# Patient Record
Sex: MALE | Race: WHITE | Employment: FULL TIME | URBAN - METROPOLITAN AREA
[De-identification: names, ages, dates, MRNs, and addresses within clinical notes are randomized per-mention and may not be internally consistent; named-entity substitution may affect disease eponyms.]

---

## 2019-11-16 LAB
ANION GAP SERPL CALCULATED.3IONS-SCNC: 13 MEQ/L (ref 9–15)
BUN BLDV-MCNC: 11 MG/DL (ref 8–23)
CALCIUM SERPL-MCNC: 9.4 MG/DL (ref 8.5–9.9)
CHLORIDE BLD-SCNC: 101 MEQ/L (ref 95–107)
CO2: 26 MEQ/L (ref 20–31)
CREAT SERPL-MCNC: 0.77 MG/DL (ref 0.7–1.2)
GFR AFRICAN AMERICAN: >60
GFR NON-AFRICAN AMERICAN: >60
GLUCOSE FASTING: 107 MG/DL (ref 70–99)
HBA1C MFR BLD: 6 % (ref 4.8–5.9)
HCT VFR BLD CALC: 47.1 % (ref 42–52)
HEMOGLOBIN: 16 G/DL (ref 14–18)
MAGNESIUM: 2.2 MG/DL (ref 1.7–2.4)
MCH RBC QN AUTO: 30.7 PG (ref 27–31.3)
MCHC RBC AUTO-ENTMCNC: 34.1 % (ref 33–37)
MCV RBC AUTO: 90 FL (ref 80–100)
PDW BLD-RTO: 13.2 % (ref 11.5–14.5)
PLATELET # BLD: 208 K/UL (ref 130–400)
POTASSIUM SERPL-SCNC: 4.4 MEQ/L (ref 3.4–4.9)
RBC # BLD: 5.23 M/UL (ref 4.7–6.1)
SODIUM BLD-SCNC: 140 MEQ/L (ref 135–144)
TSH SERPL DL<=0.05 MIU/L-ACNC: 0.85 UIU/ML (ref 0.44–3.86)
VITAMIN D 25-HYDROXY: 55.6 NG/ML (ref 30–100)
WBC # BLD: 6.1 K/UL (ref 4.8–10.8)

## 2019-11-22 ENCOUNTER — HOSPITAL ENCOUNTER (OUTPATIENT)
Dept: CARDIAC CATH/INVASIVE PROCEDURES | Age: 64
Discharge: HOME | End: 2019-11-22
Attending: INTERNAL MEDICINE | Admitting: INTERNAL MEDICINE
Payer: COMMERCIAL

## 2019-11-22 VITALS
SYSTOLIC BLOOD PRESSURE: 140 MMHG | BODY MASS INDEX: 28.7 KG/M2 | WEIGHT: 205 LBS | RESPIRATION RATE: 13 BRPM | DIASTOLIC BLOOD PRESSURE: 86 MMHG | HEART RATE: 64 BPM | HEIGHT: 71 IN | OXYGEN SATURATION: 97 %

## 2019-11-22 LAB
ANION GAP SERPL CALCULATED.3IONS-SCNC: 10 MEQ/L (ref 9–15)
BUN BLDV-MCNC: 15 MG/DL (ref 8–23)
CALCIUM SERPL-MCNC: 9.6 MG/DL (ref 8.5–9.9)
CHLORIDE BLD-SCNC: 103 MEQ/L (ref 95–107)
CO2: 27 MEQ/L (ref 20–31)
CREAT SERPL-MCNC: 0.83 MG/DL (ref 0.7–1.2)
EKG ATRIAL RATE: 70 BPM
EKG P AXIS: -2 DEGREES
EKG P-R INTERVAL: 158 MS
EKG Q-T INTERVAL: 394 MS
EKG QRS DURATION: 86 MS
EKG QTC CALCULATION (BAZETT): 425 MS
EKG R AXIS: -20 DEGREES
EKG T AXIS: -5 DEGREES
EKG VENTRICULAR RATE: 70 BPM
GFR AFRICAN AMERICAN: >60
GFR NON-AFRICAN AMERICAN: >60
GLUCOSE BLD-MCNC: 95 MG/DL (ref 70–99)
HCT VFR BLD CALC: 44.7 % (ref 42–52)
HEMOGLOBIN: 15.2 G/DL (ref 14–18)
MCH RBC QN AUTO: 30.7 PG (ref 27–31.3)
MCHC RBC AUTO-ENTMCNC: 34.1 % (ref 33–37)
MCV RBC AUTO: 89.9 FL (ref 80–100)
PDW BLD-RTO: 13.1 % (ref 11.5–14.5)
PLATELET # BLD: 197 K/UL (ref 130–400)
POTASSIUM SERPL-SCNC: 4.4 MEQ/L (ref 3.4–4.9)
RBC # BLD: 4.97 M/UL (ref 4.7–6.1)
SODIUM BLD-SCNC: 140 MEQ/L (ref 135–144)
WBC # BLD: 7.6 K/UL (ref 4.8–10.8)

## 2019-11-22 PROCEDURE — 2580000003 HC RX 258: Performed by: INTERNAL MEDICINE

## 2019-11-22 PROCEDURE — 85027 COMPLETE CBC AUTOMATED: CPT

## 2019-11-22 PROCEDURE — 6360000002 HC RX W HCPCS

## 2019-11-22 PROCEDURE — 6370000000 HC RX 637 (ALT 250 FOR IP): Performed by: INTERNAL MEDICINE

## 2019-11-22 PROCEDURE — 80048 BASIC METABOLIC PNL TOTAL CA: CPT

## 2019-11-22 PROCEDURE — 2709999900 HC NON-CHARGEABLE SUPPLY

## 2019-11-22 PROCEDURE — 2580000003 HC RX 258

## 2019-11-22 PROCEDURE — C1894 INTRO/SHEATH, NON-LASER: HCPCS

## 2019-11-22 PROCEDURE — C1769 GUIDE WIRE: HCPCS

## 2019-11-22 PROCEDURE — 2500000003 HC RX 250 WO HCPCS

## 2019-11-22 PROCEDURE — 93458 L HRT ARTERY/VENTRICLE ANGIO: CPT | Performed by: INTERNAL MEDICINE

## 2019-11-22 PROCEDURE — 6370000000 HC RX 637 (ALT 250 FOR IP)

## 2019-11-22 RX ORDER — CLOPIDOGREL BISULFATE 75 MG/1
75 TABLET ORAL DAILY
Status: ON HOLD | COMMUNITY
End: 2019-11-22 | Stop reason: HOSPADM

## 2019-11-22 RX ORDER — NITROGLYCERIN 0.4 MG/1
0.4 TABLET SUBLINGUAL EVERY 5 MIN PRN
Status: ON HOLD | COMMUNITY
End: 2019-11-22 | Stop reason: HOSPADM

## 2019-11-22 RX ORDER — SODIUM CHLORIDE 9 MG/ML
INJECTION, SOLUTION INTRAVENOUS CONTINUOUS
Status: DISCONTINUED | OUTPATIENT
Start: 2019-11-22 | End: 2019-11-22 | Stop reason: HOSPADM

## 2019-11-22 RX ORDER — SODIUM CHLORIDE 0.9 % (FLUSH) 0.9 %
10 SYRINGE (ML) INJECTION PRN
Status: DISCONTINUED | OUTPATIENT
Start: 2019-11-22 | End: 2019-11-22 | Stop reason: HOSPADM

## 2019-11-22 RX ORDER — CLOPIDOGREL BISULFATE 75 MG/1
75 TABLET ORAL ONCE
Status: COMPLETED | OUTPATIENT
Start: 2019-11-22 | End: 2019-11-22

## 2019-11-22 RX ORDER — SODIUM CHLORIDE 0.9 % (FLUSH) 0.9 %
10 SYRINGE (ML) INJECTION EVERY 12 HOURS SCHEDULED
Status: DISCONTINUED | OUTPATIENT
Start: 2019-11-22 | End: 2019-11-22 | Stop reason: HOSPADM

## 2019-11-22 RX ORDER — ISOSORBIDE MONONITRATE 30 MG/1
30 TABLET, EXTENDED RELEASE ORAL DAILY
Status: ON HOLD | COMMUNITY
End: 2019-11-22 | Stop reason: HOSPADM

## 2019-11-22 RX ORDER — ALPRAZOLAM 0.5 MG/1
0.5 TABLET ORAL
Status: DISCONTINUED | OUTPATIENT
Start: 2019-11-22 | End: 2019-11-22 | Stop reason: HOSPADM

## 2019-11-22 RX ORDER — NITROGLYCERIN 0.4 MG/1
0.4 TABLET SUBLINGUAL EVERY 5 MIN PRN
Status: DISCONTINUED | OUTPATIENT
Start: 2019-11-22 | End: 2019-11-22 | Stop reason: HOSPADM

## 2019-11-22 RX ORDER — ACETAMINOPHEN 325 MG/1
650 TABLET ORAL EVERY 4 HOURS PRN
Status: DISCONTINUED | OUTPATIENT
Start: 2019-11-22 | End: 2019-11-22 | Stop reason: SDUPTHER

## 2019-11-22 RX ORDER — ACETAMINOPHEN 325 MG/1
650 TABLET ORAL EVERY 4 HOURS PRN
Status: DISCONTINUED | OUTPATIENT
Start: 2019-11-22 | End: 2019-11-22 | Stop reason: HOSPADM

## 2019-11-22 RX ORDER — MELOXICAM 15 MG/1
15 TABLET ORAL DAILY
COMMUNITY
End: 2021-12-11

## 2019-11-22 RX ORDER — ASPIRIN 81 MG/1
81 TABLET, CHEWABLE ORAL ONCE
Status: COMPLETED | OUTPATIENT
Start: 2019-11-22 | End: 2019-11-22

## 2019-11-22 RX ORDER — MAGNESIUM HYDROXIDE/ALUMINUM HYDROXICE/SIMETHICONE 120; 1200; 1200 MG/30ML; MG/30ML; MG/30ML
30 SUSPENSION ORAL EVERY 6 HOURS PRN
Status: DISCONTINUED | OUTPATIENT
Start: 2019-11-22 | End: 2019-11-22 | Stop reason: HOSPADM

## 2019-11-22 RX ADMIN — SODIUM CHLORIDE: 9 INJECTION, SOLUTION INTRAVENOUS at 14:36

## 2019-11-22 RX ADMIN — ASPIRIN 81 MG 81 MG: 81 TABLET ORAL at 15:06

## 2019-11-22 RX ADMIN — CLOPIDOGREL BISULFATE 75 MG: 75 TABLET ORAL at 15:03

## 2019-11-22 RX ADMIN — ACETAMINOPHEN 650 MG: 325 TABLET ORAL at 18:29

## 2019-11-22 RX ADMIN — ACETAMINOPHEN 650 MG: 325 TABLET ORAL at 14:55

## 2021-12-11 ENCOUNTER — APPOINTMENT (OUTPATIENT)
Dept: GENERAL RADIOLOGY | Age: 66
End: 2021-12-11
Payer: COMMERCIAL

## 2021-12-11 ENCOUNTER — HOSPITAL ENCOUNTER (EMERGENCY)
Age: 66
Discharge: HOME | End: 2021-12-11
Payer: COMMERCIAL

## 2021-12-11 VITALS
DIASTOLIC BLOOD PRESSURE: 88 MMHG | RESPIRATION RATE: 19 BRPM | TEMPERATURE: 98 F | HEART RATE: 65 BPM | HEIGHT: 71 IN | WEIGHT: 205 LBS | BODY MASS INDEX: 28.7 KG/M2 | OXYGEN SATURATION: 97 % | SYSTOLIC BLOOD PRESSURE: 170 MMHG

## 2021-12-11 DIAGNOSIS — M25.562 ACUTE PAIN OF LEFT KNEE: Primary | ICD-10-CM

## 2021-12-11 PROCEDURE — 99283 EMERGENCY DEPT VISIT LOW MDM: CPT

## 2021-12-11 PROCEDURE — 96372 THER/PROPH/DIAG INJ SC/IM: CPT

## 2021-12-11 PROCEDURE — 6360000002 HC RX W HCPCS: Performed by: PHYSICIAN ASSISTANT

## 2021-12-11 PROCEDURE — 73564 X-RAY EXAM KNEE 4 OR MORE: CPT

## 2021-12-11 RX ORDER — NAPROXEN 500 MG/1
500 TABLET ORAL 2 TIMES DAILY WITH MEALS
Qty: 60 TABLET | Refills: 0 | Status: SHIPPED | OUTPATIENT
Start: 2021-12-11

## 2021-12-11 RX ORDER — KETOROLAC TROMETHAMINE 30 MG/ML
60 INJECTION, SOLUTION INTRAMUSCULAR; INTRAVENOUS ONCE
Status: COMPLETED | OUTPATIENT
Start: 2021-12-11 | End: 2021-12-11

## 2021-12-11 RX ORDER — NAPROXEN 500 MG/1
500 TABLET ORAL 2 TIMES DAILY WITH MEALS
Qty: 60 TABLET | Refills: 0 | Status: SHIPPED | OUTPATIENT
Start: 2021-12-11 | End: 2021-12-11

## 2021-12-11 RX ADMIN — KETOROLAC TROMETHAMINE 60 MG: 30 INJECTION, SOLUTION INTRAMUSCULAR at 16:04

## 2021-12-11 ASSESSMENT — ENCOUNTER SYMPTOMS
APNEA: 0
SHORTNESS OF BREATH: 0
COLOR CHANGE: 0
ABDOMINAL PAIN: 0
TROUBLE SWALLOWING: 0
ALLERGIC/IMMUNOLOGIC NEGATIVE: 1
EYE PAIN: 0

## 2021-12-11 ASSESSMENT — PAIN DESCRIPTION - ORIENTATION: ORIENTATION: LEFT

## 2021-12-11 ASSESSMENT — PAIN SCALES - GENERAL
PAINLEVEL_OUTOF10: 1
PAINLEVEL_OUTOF10: 10

## 2021-12-11 ASSESSMENT — PAIN DESCRIPTION - LOCATION: LOCATION: KNEE

## 2021-12-11 ASSESSMENT — PAIN DESCRIPTION - PAIN TYPE: TYPE: ACUTE PAIN

## 2021-12-11 NOTE — ED PROVIDER NOTES
[address] [facility] ED  eMERGENCYdEPARTMENT eNCOUnter      Pt Name: Srinivas Arnett  MRN: [id number]  [place] 1955  Date of evaluation: 12/11/2021  Provider:All Elder PA-C    CHIEF COMPLAINT       Chief Complaint   Patient presents with    Knee Injury     PT WAS LIFTING AT WORK WHEN THE PAIN STARTED         HISTORY OF PRESENT ILLNESS  (Location/Symptom, Timing/Onset, Context/Setting, Quality, Duration, Modifying Factors, Severity.)   Srinivas Arnett is a 77 y.o. male who presents to the emergency department left knee pain primarily through the posterior cruciate ligament region and medial cruciate ligament. Patient states that he was at work on Wednesday and had bent over and was lifting parts when he felt a snap in the back of his knee and had difficulty standing. Patient does complain of some mild joint laxity since the injury occurred. Patient denies any numbness or tingling. HPI    Nursing Notes were reviewed and I agree. REVIEW OF SYSTEMS    (2-9 systems for level 4, 10 or more for level 5)     Review of Systems   Constitutional: Negative for diaphoresis and fever. HENT: Negative for hearing loss and trouble swallowing. Eyes: Negative for pain. Respiratory: Negative for apnea and shortness of breath. Cardiovascular: Negative for chest pain. Gastrointestinal: Negative for abdominal pain. Endocrine: Negative. Genitourinary: Negative for hematuria. Musculoskeletal: Positive for joint swelling. Negative for neck pain and neck stiffness. Skin: Negative for color change. Allergic/Immunologic: Negative. Neurological: Negative for dizziness and numbness. Hematological: Negative. Psychiatric/Behavioral: Negative. All other systems reviewed and are negative. Except as noted above the remainder of the review of systems was reviewed and negative. PAST MEDICAL HISTORY   History reviewed. No pertinent past medical history.       SURGICAL HISTORY     History reviewed. No pertinent surgical history. CURRENT MEDICATIONS       Discharge Medication List as of 12/11/2021  4:09 PM      CONTINUE these medications which have NOT CHANGED    Details   aspirin 81 MG tablet Take 81 mg by mouth dailyHistorical Med      metoprolol tartrate (LOPRESSOR) 25 MG tablet Take 25 mg by mouth 2 times dailyHistorical Med             ALLERGIES     Patient has no known allergies. FAMILY HISTORY     History reviewed. No pertinent family history. SOCIAL HISTORY       Social History     Socioeconomic History    Marital status: Single     Spouse name: None    Number of children: None    Years of education: None    Highest education level: None   Occupational History    None   Tobacco Use    Smoking status: Never Smoker    Smokeless tobacco: Never Used   Substance and Sexual Activity    Alcohol use: Not Currently     Alcohol/week: 2.0 standard drinks     Types: 2 Cans of beer per week    Drug use: Not Currently    Sexual activity: Not Currently   Other Topics Concern    None   Social History Narrative    None     Social Determinants of Health     Financial Resource Strain:     Difficulty of Paying Living Expenses: Not on file   Food Insecurity:     Worried About Running Out of Food in the Last Year: Not on file    [place] of Food in the Last Year: Not on file   Transportation Needs:     Lack of Transportation (Medical): Not on file    Lack of Transportation (Non-Medical):  Not on file   Physical Activity:     Days of Exercise per Week: Not on file    Minutes of Exercise per Session: Not on file   Stress:     Feeling of Stress : Not on file   Social Connections:     Frequency of Communication with Friends and Family: Not on file    Frequency of Social Gatherings with Friends and Family: Not on file    Attends Adventist Services: Not on file    Active Member of Clubs or Organizations: Not on file    Attends Club or Organization Meetings: Not on file    Marital Status: Not on file   Intimate Partner Violence:     Fear of Current or Ex-Partner: Not on file    Emotionally Abused: Not on file    Physically Abused: Not on file    Sexually Abused: Not on file   Housing Stability:     Unable to Pay for Housing in the Last Year: Not on file    Number of [place] in the Last Year: Not on file    Unstable Housing in the Last Year: Not on file       SCREENINGS           PHYSICAL EXAM    (up to 7 forlevel 4, 8 or more for level 5)     ED Triage Vitals   BP Temp Temp src Pulse Resp SpO2 Height Weight   12/11/21 1509 12/11/21 1503 -- 12/11/21 1503 12/11/21 1503 12/11/21 1503 12/11/21 1503 12/11/21 1503   (!) 170/88 98 °F (36.7 °C)  65 19 97 % 5' 11\" (1.803 m) 205 lb (93 kg)       Physical Exam  Vitals and nursing note reviewed. Constitutional:       General: He is not in acute distress. Appearance: He is well-developed. He is not diaphoretic. HENT:      Head: Normocephalic and atraumatic. Mouth/Throat:      Pharynx: No oropharyngeal exudate. Eyes:      General: No scleral icterus. Conjunctiva/sclera: Conjunctivae normal.      Pupils: Pupils are equal, round, and reactive to light. Neck:      Trachea: No tracheal deviation. Cardiovascular:      Rate and Rhythm: Normal rate. Heart sounds: Normal heart sounds. Pulmonary:      Effort: Pulmonary effort is normal. No respiratory distress. Breath sounds: Normal breath sounds. Abdominal:      General: Bowel sounds are normal. There is no distension. Palpations: Abdomen is soft. Musculoskeletal:         General: Normal range of motion. Cervical back: Normal range of motion and neck supple. Left knee: Tenderness present over the MCL and PCL. Skin:     General: Skin is warm and dry. Findings: No erythema or rash. Neurological:      Mental Status: He is alert and oriented to person, place, and time. Cranial Nerves: No cranial nerve deficit.       Motor: No abnormal muscle tone. Psychiatric:         Behavior: Behavior normal.         Thought Content: Thought content normal.         Judgment: Judgment normal.           DIAGNOSTIC RESULTS     RADIOLOGY:   Non-plain film images such as CT, Ultrasound and MRI are read by the radiologist. Plain radiographic images are visualized and preliminarilyinterpreted by Barry Power PA-C with the below findings:    No fx    Interpretation per the Radiologist below, if available at the time of this note:    XR KNEE LEFT (MIN 4 VIEWS)   Final Result   There are no acute osseous changes. There is a small joint effusion. LABS:  Labs Reviewed - No data to display    All other labs were within normal range or not returnedas of this dictation. EMERGENCYDEPARTMENT COURSE and DIFFERENTIAL DIAGNOSIS/MDM:   Vitals:    Vitals:    12/11/21 1503 12/11/21 1509   BP:  (!) 170/88   Pulse: 65    Resp: 19    Temp: 98 °F (36.7 °C)    SpO2: 97%    Weight: 205 lb (93 kg)    Height: 5' 11\" (1.803 m)        REASSESSMENT        Presented with left knee pain following an injury at work. Care was subsequently turned over to Aracelis Brand PA-C who discharged the patient. MDM    PROCEDURES:    Procedures      FINAL IMPRESSION      1.  Acute pain of left knee          DISPOSITION/PLAN   DISPOSITION Decision To Discharge 12/11/2021 04:05:33 PM      PATIENT REFERRED TO:  Juan Pablo Nunezbret Gilnakita [facility]  [address] [place] [phone]  Schedule an appointment as soon as possible for a visit       [address]  [facility] [address]  [address] [place] [address] Road:  Discharge Medication List as of 12/11/2021  4:09 PM      START taking these medications    Details   naproxen (NAPROSYN) 500 MG tablet Take 1 tablet by mouth 2 times daily (with meals), Disp-60 tablet, R-0Print             (Please note that portions of this note were completed with a voice recognition program.  Efforts were made to edit the dictations but occasionally words are mis-transcribed.)    TRAN Ramos PA-C  12/11/21 [id number]

## 2021-12-11 NOTE — Clinical Note
José Miguel Cannon was seen and treated in our emergency department on 12/11/2021. He may return to work on 12/14/2021. If you have any questions or concerns, please don't hesitate to call.       John Tomlinson PA-C

## 2022-01-14 ENCOUNTER — HOSPITAL ENCOUNTER (OUTPATIENT)
Dept: MRI IMAGING | Age: 67
Discharge: HOME | End: 2022-01-16
Payer: COMMERCIAL

## 2022-01-14 DIAGNOSIS — S83.92XD SPRAIN OF LEFT KNEE, UNSPECIFIED LIGAMENT, SUBSEQUENT ENCOUNTER: ICD-10-CM

## 2022-01-14 PROCEDURE — 73721 MRI JNT OF LWR EXTRE W/O DYE: CPT

## 2022-01-20 ENCOUNTER — OFFICE VISIT (OUTPATIENT)
Dept: ORTHOPEDIC SURGERY | Age: 67
End: 2022-01-20
Payer: COMMERCIAL

## 2022-01-20 VITALS
HEIGHT: 71 IN | BODY MASS INDEX: 28.73 KG/M2 | WEIGHT: 205.2 LBS | OXYGEN SATURATION: 98 % | TEMPERATURE: 97.4 F | HEART RATE: 78 BPM

## 2022-01-20 DIAGNOSIS — S86.912D STRAIN OF LEFT KNEE, SUBSEQUENT ENCOUNTER: Primary | ICD-10-CM

## 2022-01-20 DIAGNOSIS — S80.02XD CONTUSION OF LEFT KNEE, SUBSEQUENT ENCOUNTER: ICD-10-CM

## 2022-01-20 PROCEDURE — 20610 DRAIN/INJ JOINT/BURSA W/O US: CPT | Performed by: ORTHOPAEDIC SURGERY

## 2022-01-20 PROCEDURE — 99204 OFFICE O/P NEW MOD 45 MIN: CPT | Performed by: ORTHOPAEDIC SURGERY

## 2022-01-20 RX ORDER — CARVEDILOL 12.5 MG/1
TABLET ORAL
COMMUNITY
Start: 2021-10-21

## 2022-01-20 RX ORDER — LIDOCAINE HYDROCHLORIDE 10 MG/ML
5 INJECTION, SOLUTION INFILTRATION; PERINEURAL ONCE
Status: COMPLETED | OUTPATIENT
Start: 2022-01-20 | End: 2022-01-20

## 2022-01-20 RX ORDER — METHYLPREDNISOLONE ACETATE 80 MG/ML
80 INJECTION, SUSPENSION INTRA-ARTICULAR; INTRALESIONAL; INTRAMUSCULAR; SOFT TISSUE ONCE
Status: COMPLETED | OUTPATIENT
Start: 2022-01-20 | End: 2022-01-20

## 2022-01-20 RX ADMIN — LIDOCAINE HYDROCHLORIDE 5 ML: 10 INJECTION, SOLUTION INFILTRATION; PERINEURAL at 16:35

## 2022-01-20 RX ADMIN — METHYLPREDNISOLONE ACETATE 80 MG: 80 INJECTION, SUSPENSION INTRA-ARTICULAR; INTRALESIONAL; INTRAMUSCULAR; SOFT TISSUE at 16:38

## 2022-01-20 NOTE — PROGRESS NOTES
Subjective:      Patient ID: Severiano Starling is a 77 y.o. male who presents today for:  Chief Complaint   Patient presents with    Knee Injury     left knee injury. MRI done 01/14/2022       HPI  Seeing patient for a injury left knee according to patient he was lifting a large door and some hinges and felt his left knee give way and felt significant amounts of pain. This occurred over 1 month ago and was seen as a Worker's Compensation injury by occupational health. He did some exercises pain is got no better MRI finally ordered, patient presents today for further evaluation. No past medical history on file. No past surgical history on file. Social History     Socioeconomic History    Marital status: Single     Spouse name: Not on file    Number of children: Not on file    Years of education: Not on file    Highest education level: Not on file   Occupational History    Not on file   Tobacco Use    Smoking status: Never Smoker    Smokeless tobacco: Never Used   Substance and Sexual Activity    Alcohol use: Not Currently     Alcohol/week: 2.0 standard drinks     Types: 2 Cans of beer per week    Drug use: Not Currently    Sexual activity: Not Currently   Other Topics Concern    Not on file   Social History Narrative    Not on file     Social Determinants of Health     Financial Resource Strain:     Difficulty of Paying Living Expenses: Not on file   Food Insecurity:     Worried About Running Out of Food in the Last Year: Not on file    [place]ho[place] of Food in the Last Year: Not on file   Transportation Needs:     Lack of Transportation (Medical): Not on file    Lack of Transportation (Non-Medical):  Not on file   Physical Activity:     Days of Exercise per Week: Not on file    Minutes of Exercise per Session: Not on file   Stress:     Feeling of Stress : Not on file   Social Connections:     Frequency of Communication with Friends and Family: Not on file    Frequency of Social Gatherings with Friends and Family: Not on file    Attends Buddhist Services: Not on file    Active Member of Clubs or Organizations: Not on file    Attends Club or Organization Meetings: Not on file    Marital Status: Not on file   Intimate Partner Violence:     Fear of Current or Ex-Partner: Not on file    Emotionally Abused: Not on file    Physically Abused: Not on file    Sexually Abused: Not on file   Housing Stability:     Unable to Pay for Housing in the Last Year: Not on file    Number of [place] in the Last Year: Not on file    Unstable Housing in the Last Year: Not on file     No Known Allergies  Current Outpatient Medications on File Prior to Visit   Medication Sig Dispense Refill    carvedilol (COREG) 12.5 MG tablet TAKE 1 TABLET TWICE DAILY.  naproxen (NAPROSYN) 500 MG tablet Take 1 tablet by mouth 2 times daily (with meals) (Patient not taking: Reported on 1/20/2022) 60 tablet 0    aspirin 81 MG tablet Take 81 mg by mouth daily (Patient not taking: Reported on 1/20/2022)      metoprolol tartrate (LOPRESSOR) 25 MG tablet Take 25 mg by mouth 2 times daily (Patient not taking: Reported on 1/20/2022)       No current facility-administered medications on file prior to visit. Review of Systems    Objective:   Pulse 78   Temp 97.4 °F (36.3 °C) (Temporal)   Ht 5' 11\" (1.803 m)   Wt 205 lb 3.2 oz (93.1 kg)   SpO2 98%   BMI 28.62 kg/m²   Ortho Exam   Primary months of pain is over the anteromedial aspect of his left knee. His range of motion is very rigid from 10 degrees short of full extension only to about 90 degrees of flexion at which time his pain overcomes him and he is not able to do any more movement. His collateral ligaments are intact and I am not able to detect a positive Lachman at this time.   His extensor mechanism is intact  Radiographs and Laboratory Studies:     Diagnostic Imaging Studies:    X-rays that were previously taken of his left knee failed to show any sign of joint space narrowing tricompartmentally or any sign of dislocation or fracture. MRI reading is quite extensive however does not reveal any ligamentous or meniscal pathology that would suggest immediate surgical intervention. This demonstrates primarily bone bruising involving the medial compartment    Assessment:       Diagnosis Orders   1. Strain of left knee, subsequent encounter     2. Contusion of left knee, subsequent encounter           Plan:     Feel cortisone shot is indicated being that he has not been injected at all, this was done using a sterile prep injecting 5 cc 1% lidocaine 1 cc of 80 mg Depo-Medrol into the knee without any resistance  Obviously he is not able to return to work and can follow-up with me in about a month     No orders of the defined types were placed in this encounter. No orders of the defined types were placed in this encounter. No follow-ups on file.       Emy Espinoza MD

## 2022-01-20 NOTE — PROGRESS NOTES
Patient's name, date of birth, and allergies have been confirmed. Patient is aware that injection is to be given in Left knee. He/she is aware that they will be seeing Dr. Orlando Vital and the injection will be given by him. A timeout was performed immediately prior to the start of the cortisone injection procedure and included the correct patient (two identifiers), correct procedure and correct site(s). Procedure consent and allergies were also verified.

## 2022-02-17 ENCOUNTER — OFFICE VISIT (OUTPATIENT)
Dept: ORTHOPEDIC SURGERY | Age: 67
End: 2022-02-17
Payer: COMMERCIAL

## 2022-02-17 ENCOUNTER — HOSPITAL ENCOUNTER (OUTPATIENT)
Dept: ORTHOPEDIC SURGERY | Age: 67
Discharge: HOME | End: 2022-02-19
Payer: COMMERCIAL

## 2022-02-17 VITALS
TEMPERATURE: 97.1 F | RESPIRATION RATE: 16 BRPM | WEIGHT: 205 LBS | HEART RATE: 69 BPM | OXYGEN SATURATION: 95 % | BODY MASS INDEX: 28.7 KG/M2 | HEIGHT: 71 IN

## 2022-02-17 DIAGNOSIS — S86.912D STRAIN OF LEFT KNEE, SUBSEQUENT ENCOUNTER: Primary | ICD-10-CM

## 2022-02-17 DIAGNOSIS — S86.912D STRAIN OF LEFT KNEE, SUBSEQUENT ENCOUNTER: ICD-10-CM

## 2022-02-17 DIAGNOSIS — M17.0 PRIMARY OSTEOARTHRITIS OF BOTH KNEES: ICD-10-CM

## 2022-02-17 PROCEDURE — 99214 OFFICE O/P EST MOD 30 MIN: CPT | Performed by: PHYSICIAN ASSISTANT

## 2022-02-17 PROCEDURE — 73562 X-RAY EXAM OF KNEE 3: CPT | Performed by: ORTHOPAEDIC SURGERY

## 2022-02-17 PROCEDURE — 73562 X-RAY EXAM OF KNEE 3: CPT

## 2022-02-17 RX ORDER — MELOXICAM 15 MG/1
15 TABLET ORAL DAILY PRN
Qty: 30 TABLET | Refills: 0 | Status: SHIPPED | OUTPATIENT
Start: 2022-02-17

## 2022-02-17 NOTE — PROGRESS NOTES
[facility]      Subjective:      Chief Complaint   Patient presents with    Follow-up     1 mo from 1/20/2022, due to Strain of left knee. Pt states it's getting better pain isn't as bad as before. Has been able to walk on it. HPI: Bert Leal is a 79 y.o. male who is here for left knee pain. Initially seen by Dr. Isa Acevedo about a month ago where given injection. MRI showed degenerative meniscus, bone bruising and arthritic changes. States that the injection helped for a few weeks but his pain is since returned. No locking or instability. He has a hinged knee brace but it is not been wearing it. Taking Aleve as needed    No past medical history on file. No past surgical history on file. Social History     Socioeconomic History    Marital status: Single     Spouse name: Not on file    Number of children: Not on file    Years of education: Not on file    Highest education level: Not on file   Occupational History    Not on file   Tobacco Use    Smoking status: Never Smoker    Smokeless tobacco: Never Used   Substance and Sexual Activity    Alcohol use: Not Currently     Alcohol/week: 2.0 standard drinks     Types: 2 Cans of beer per week    Drug use: Not Currently    Sexual activity: Not Currently   Other Topics Concern    Not on file   Social History Narrative    Not on file     Social Determinants of Health     Financial Resource Strain:     Difficulty of Paying Living Expenses: Not on file   Food Insecurity:     Worried About Running Out of Food in the Last Year: Not on file    Jhoana of Food in the Last Year: Not on file   Transportation Needs:     Lack of Transportation (Medical): Not on file    Lack of Transportation (Non-Medical):  Not on file   Physical Activity:     Days of Exercise per Week: Not on file    Minutes of Exercise per Session: Not on file   Stress:     Feeling of Stress : Not on file   Social Connections:     Frequency of Communication with Friends and Family: Not on file    Frequency of Social Gatherings with Friends and Family: Not on file    Attends Judaism Services: Not on file    Active Member of Clubs or Organizations: Not on file    Attends Club or Organization Meetings: Not on file    Marital Status: Not on file   Intimate Partner Violence:     Fear of Current or Ex-Partner: Not on file    Emotionally Abused: Not on file    Physically Abused: Not on file    Sexually Abused: Not on file   Housing Stability:     Unable to Pay for Housing in the Last Year: Not on file    Number of [place] in the Last Year: Not on file    Unstable Housing in the Last Year: Not on file     No family history on file. No Known Allergies  Current Outpatient Medications on File Prior to Visit   Medication Sig Dispense Refill    carvedilol (COREG) 12.5 MG tablet TAKE 1 TABLET TWICE DAILY. (Patient not taking: Reported on 2/17/2022)      naproxen (NAPROSYN) 500 MG tablet Take 1 tablet by mouth 2 times daily (with meals) (Patient not taking: Reported on 1/20/2022) 60 tablet 0    aspirin 81 MG tablet Take 81 mg by mouth daily (Patient not taking: Reported on 1/20/2022)      metoprolol tartrate (LOPRESSOR) 25 MG tablet Take 25 mg by mouth 2 times daily (Patient not taking: Reported on 1/20/2022)       No current facility-administered medications on file prior to visit. Objective:   Pulse 69   Temp 97.1 °F (36.2 °C) (Temporal)   Resp 16   Ht 5' 11\" (1.803 m)   Wt 205 lb (93 kg)   SpO2 95%   BMI 28.59 kg/m²       Radiographs and Laboratory Studies:   Previous diagnostic imaging studies were reviewed. Narrative   MRI of the left knee.       HISTORY: Pain and limited range of motion.  Per 12/11/2021 electronic medical records \"Patient states that he was at work on Wednesday and had bent over and was lifting parts when he felt a snap in the back of his knee and had difficulty standing\".       COMPARISON: 12/11/2021 left knee x-ray..       TECHNIQUE: Coronal and sagittal proton-density with fat suppression. Sagittal T2 with fat suppression. Axial T2 with fat suppression. Coronal T1. 3 plane gradient echo localizer. .           FINDINGS:       Moderate to marked anterior diffuse periarticular subcutaneous fat edema.        Vastus medialis and lateralis posterior muscle margin mild edema signal just above the patellar level likely representing mild strain.       Medial femoral condyle and proximal medial tibial diffuse moderate marrow edema-like signal extending to the midline compatible with bone bruising, given the history of recent injury, covering a nearly 5 cm in maximum diameter portion of the femur and 4    cm of the tibia. Tibial linear subchondral decreased signal line paralleling the weightbearing surface may represent trabecular crowding associated with subchondral trabecular impaction or is degenerative sclerosis signal. No significant tibial plateau    depression. Question subtle low signal trabecular crowding or localized bone bruising sparing at the anterior medial femoral condyle. No osteochondral defects.       Medial joint compartment articular cartilage surfaces are relatively well-maintained. There may be very mild tibial cartilage thinning.       Medial meniscus mid body inner third tip truncation either degenerative or associated with recent injury. Posterior horn medial meniscus and adjacent posterior meniscal body faint poorly defined linear intrasubstance signal extending from the outer third    towards the inferior articulating margin of the middle third representing degeneration and/or degenerative tearing. Alternatively meniscal contusion given the medial joint compartment bone bruising. No fluid bright signal within this area to    unequivocally confirm tear.  Posterior medial meniscal capsular structures and semimembranosus tendon tibial expansions are intact.       Medial collateral ligament is intact without discontinuity. Mild reactive edema deep to the ligament likely associated with medial bone bruising described above.       Lateral meniscus is intact. Nonspecific anterior horn lateral meniscal root signal can be accounted for by decussation of the adjacent anterior cruciate ligament fibers. Alternatively degeneration.       Lateral collateral ligament, biceps femoris distal tendon, and iliotibial band are intact.       Lateral joint compartment articular cartilage surfaces relatively well-maintained.       Popliteus musculotendinous junction approximate 6 x 14 mm in diameter area of fluid bright signal compatible with small muscle strain/partial tear. Small amount of fluid extending more superiorly along the tendon sheath. Popliteus tendon near the femoral    attachment has mild-moderate intrasubstance signal but no discontinuity either representing degeneration or tendon strain.       Anterior cruciate ligament mild-moderate intrasubstance signal but a taut morphology without significant fiber discontinuity. Findings may represent mild sprain and/or degenerative signal. Some of the increased signal can relate to adjacent synovium.       Posterior cruciate ligament is intact.       Patellar tendon is intact. Mild intrasubstance signal focally at the patellar attachment is typically degenerative. Mild sprain or tendinosis not excluded. Quadriceps tendon and patellar retinacula intact. No patella patience or [place]. Hoffa's fat pad edema    likely reactive associated with femoral and tibial bone bruising.       Patellofemoral articular cartilage surfaces relatively well-maintained. Subcentimeter subchondral bone bruising or vessel at the femoral trochlea.       Small knee joint effusion. Mild synovial hypertrophy in the suprapatellar recess. No fluid differential signal level to indicate intra-articular hemorrhage.               Impression       Medial femoral and tibial bone bruising.  Subtle medial tibial subchondral effects of this. We also discussed an off  brace. This could very much help with his symptoms. Is instructed to work on therapy exercises. We will see her back in 4 weeks and if is not improving can consider an injection in a custom brace. If he is to be seen sooner he will call our office to let us know. The above plan was discussed in thorough detail with Dr. Tien Linares and the patient. No orders of the defined types were placed in this encounter. No orders of the defined types were placed in this encounter. No follow-ups on file.     Efe Da Silva PA-C  [address] [facility]  [phone]

## 2022-02-22 ENCOUNTER — HOSPITAL ENCOUNTER (OUTPATIENT)
Dept: PHYSICAL THERAPY | Age: 67
Setting detail: THERAPIES SERIES
Discharge: HOME | End: 2022-02-22
Payer: COMMERCIAL

## 2022-02-22 PROCEDURE — 97110 THERAPEUTIC EXERCISES: CPT | Performed by: PHYSICAL THERAPIST

## 2022-02-22 PROCEDURE — 97162 PT EVAL MOD COMPLEX 30 MIN: CPT | Performed by: PHYSICAL THERAPIST

## 2022-02-22 PROCEDURE — G0283 ELEC STIM OTHER THAN WOUND: HCPCS | Performed by: PHYSICAL THERAPIST

## 2022-02-22 ASSESSMENT — PAIN DESCRIPTION - ONSET: ONSET: SUDDEN

## 2022-02-22 ASSESSMENT — PAIN DESCRIPTION - FREQUENCY: FREQUENCY: CONTINUOUS

## 2022-02-22 ASSESSMENT — PAIN DESCRIPTION - PROGRESSION: CLINICAL_PROGRESSION: NOT CHANGED

## 2022-02-22 ASSESSMENT — PAIN SCALES - GENERAL: PAINLEVEL_OUTOF10: 4

## 2022-02-22 ASSESSMENT — PAIN DESCRIPTION - DESCRIPTORS: DESCRIPTORS: SHARP

## 2022-02-22 ASSESSMENT — PAIN DESCRIPTION - LOCATION: LOCATION: KNEE

## 2022-02-22 ASSESSMENT — PAIN DESCRIPTION - ORIENTATION: ORIENTATION: LEFT;MID

## 2022-02-22 ASSESSMENT — PAIN DESCRIPTION - PAIN TYPE: TYPE: ACUTE PAIN

## 2022-02-22 ASSESSMENT — PAIN - FUNCTIONAL ASSESSMENT: PAIN_FUNCTIONAL_ASSESSMENT: PREVENTS OR INTERFERES SOME ACTIVE ACTIVITIES AND ADLS

## 2022-02-22 NOTE — PROGRESS NOTES
[facility]  PHYSICAL THERAPY EVALUATION    Date: 2022  Patient Name: Celia Ruiz       MRN: [id number]   Account: [de-identified]   : 1955  (79 y.o.)   Gender: male   Referring Practitioner: Gloria Barrios M.D. Diagnosis: Sprain of the left knee  Treatment Diagnosis: Painful and weak left knee with antalgic gait pattern  Additional Pertinent Hx: High blood pressure        Other position/activity restrictions: Currently off work, no light duty    Past Medical History:  has no past medical history on file. Past Surgical History:   has no past surgical history on file. Vital Signs  Patient Currently in Pain: Yes   Pain Screening  Patient Currently in Pain: Yes  Pain Assessment  Pain Assessment: 0-10  Pain Level: 4  Patient's Stated Pain Goal: No pain  Pain Type: Acute pain  Pain Location: Knee  Pain Orientation: Left;Mid  Pain Descriptors: Sharp  Pain Frequency: Continuous  Pain Onset: Sudden  Clinical Progression: Not changed  Functional Pain Assessment: Prevents or interferes some active activities and ADLs                Lives With: Alone  Type of Home: House  Home Layout: Two level  Home Access: Stairs to enter with rails  Entrance Stairs - Number of Steps: 2  Bathroom Shower/Tub: Walk-in shower  ADL Assistance: Independent  Homemaking Assistance: Independent  Ambulation Assistance: Independent  Active : Yes  Mode of Transportation: Truck  Occupation: Full time employment        Subjective:  Subjective: Patient reports that he was lifting a panel and bend down and his left knee \"let go\"  He had immediate severe pain, and could not walk on his left. It also immediately swelled. He went to ER and had an X-ray which was neg. He was seen in Occupational health in [place]  He had an MRI which revealed possible medial meniscus tearing and bone bruising. He was seen by ortho injected his knee, but the patient stated that it made it worse.   He was then ordered physical therapy. Objective:        Strength RLE  Strength RLE: WNL  Strength LLE  Strength LLE: Exception  L Hip Flexion: 4-/5  L Hip Extension: 4-/5  L Hip ABduction: 4-/5  L Knee Flexion: 4-/5  L Knee Extension: 4-/5  L Ankle Dorsiflexion: 4-/5  L Ankle Plantar Flexion: 4-/5                AROM RLE (degrees)  RLE AROM: WNL     AROM LLE (degrees)  LLE AROM : Exceptions  L Hip Flexion 0-125: 0-90  L Hip Extension 0-10: 0  L Hip ABduction 0-45: 0-30  L Hip ADduction 0-10: 0-10  L Knee Flexion 0-145: 0-90  L Knee Extension 0: -15           Additional Measures  Girth: Left knee:  mid patella 40.5 cm, superior patella 41.5 cm, infrapatella 36 cm  Other: Atrophy of the left thigh         Exercises:   Exercises  Exercise 1: Quad sets  Exercise 2: SLR  Exercise 3: heel slides  Exercise 4: Sitting full arcs  Modalities: IES and moist heat        *Indicates exercise,modality, or manual techniques to be initiated when appropriate  Assessment: Body structures, Functions, Activity limitations: Decreased functional mobility ,Decreased ROM,Decreased strength,Increased pain  Assessment: Problem List:  1. Decrease active range of motion left knee  2. Decreased strength of the left knee 3.  mild swelling left knee  4. Antalgic gait pattern  5. Pain left knee 6. LEFI score   7.   Unable to tolerate regular work duty  Prognosis: Good  Discharge Recommendations: Continue to assess pending progress  Activity Tolerance: Patient limited by pain     Decision Making: Low Complexity  History: low  Exam: Medium  Clinical Presentation: Medium        Outcomes Score:     Plan  Frequency/Duration:  Plan  Times per week: 2  Plan weeks: 6  Current Treatment Recommendations: [id number] Exercise Program         Patient Education  New Education Provided: PT Education: Goals;PT Role;Plan of Care;Home Exercise Program    POST-PAIN     Pain Rating (0-10 pain scale): 4  /10  Location and pain description same as pre-treatment unless indicated. Action: [] NA  [] Call Physician  [x] Perform HEP  [] Meds as prescribed    Evaluation and patient rights have been reviewed and patient agrees with plan of care. Yes  [x]  No  []   Explain:       Teresa Fall Risk Assessment  Risk Factor Scale  Score   History of Falls [] Yes  [x] No 25  0 0   Secondary Diagnosis [] Yes  [x] No 15  0 0   Ambulatory Aid [] Furniture  [] Crutches/cane/walker  [x] None/bedrest/wheelchair/nurse 30  15  0 0   IV/Heparin Lock [] Yes  [x] No 20  0 0   Gait/Transferring [] Impaired  [] Weak  [x] Normal/bedrest/immobile 20  10  0 0   Mental Status [] Forgets limitations  [x] Oriented to own ability 15  0 0      Total:       0     Based on the Assessment score: check the appropriate box. [x]  No intervention needed   Low =   Score of 0-24  []  Use standard prevention interventions Moderate =  Score of 24-44   [] Discuss fall prevention strategies   [] Indicate moderate falls risk on eval  []  Use high risk prevention interventions High = Score of 45 and higher   [] Discuss fall prevention strategies   [] Provide supervision during treatment time    Goals  Short term goals  Time Frame for Short term goals: 3-5 treatments  Short term goal 1:  Increase flexion to 100 degrees, extension to 0  Short term goal 2: No further soft tissue swelling of the left knee  Short term goal 3: Pain decreased to 2/10  Long term goals  Time Frame for Long term goals : 6-12 treatment  Long term goal 1: Active range of motion of the left knee within normal limits  Long term goal 2: Strength of the left knee 5/5  Long term goal 3: Pain 0/10 to 1/10 left knee  Long term goal 4: Ambulate with reciprical gait with no antalgia  Long term goal 5: LEFI score will indicate no to minimal disabililty  Long term goal 6: Patient will be able to return to full duty    Treatment Initiated : RENU and moist heat to the left knee and range of motion ex    PT Individual Minutes  Time In: 1560  Time Out: 1110  Minutes: 55  Timed Code Treatment Minutes: 20 Minutes  Procedure Minutes: 20 minutes eval, IES 15 minutes     Modality Time In Time Out Total Time Units    PT Evaluation: Low Complexity (39024)       PT Evaluation: Moderate Complexity (32883) 1015 1035 20 1   PT Evaluation:High Complexity (42256)       Ther ex (99543) 1050 1110 20 1   Manual Therapy (86731)       Neuro re-ed (50175)       Massage (22461)       Estim unattended   (39593) 1035 1050 15 1       Electronically signed by Bree Auguste, PT on 2/22/22 at 5:44 PM EST

## 2022-02-23 NOTE — PROGRESS NOTES
[address]  PHYSICAL THERAPY PLAN OF CARE   [address], [address] [facility]         Ph: [phone]  Fax: [phone]    [] Certification  [] Recertification [x]  Plan of Care  [] Progress Note [] Discharge      To:  Abelardo Sanches M.D. From:  Khloe Venegas PT  Patient: Lieutenant Null     : 1955  Diagnosis: Sprain of the left knee     Date: 2022  Treatment Diagnosis: Painful and weak left knee with antalgic gait pattern    Plan of Care/Certification Expiration Date: 03/15/22  Progress Report Period from:  2022  to 2022    Total # of Visits to Date: 1              OBJECTIVE:   Short Term Goals - Time Frame for Short term goals: 3-5 treatments    Goals Current/Discharge status  Met   Short term goal 1:  Increase flexion to 100 degrees, extension to 0  Knee flexion 0-90, extension -15 [] yes  [] no   Short term goal 2: No further soft tissue swelling of the left knee  Mild swelling of the left knee [] yes  [] no   Short term goal 3: Pain decreased to 2/10  Pain 4/10 [] yes  [] no       [] yes  [] no      []  yes  []  no     Long Term Goals - Time Frame for Long term goals : 6-12 treatment  Goals Current/ Discharge status Met   Long term goal 1: Active range of motion of the left knee within normal limits See above [] yes  [] no   Long term goal 2: Strength of the left knee 5/5 Strength of the left knee grossly 4-/5 [] yes  [] no   Long term goal 3: Pain 0/10 to 1/10 left knee Pain 4/10 [] yes  [] no   Long term goal 4: Ambulate with reciprical gait with no antalgia Antalgic gait pattern noted [] yes  [] no   Long term goal 5: LEFI score will indicate no to minimal disabililty Moderate disability  [] yes  [] no    Long term goal 6: Patient will be able to return to full duty Currently off work [] yes  [] no       [] yes  [] no        Body structures, Functions, Activity limitations: Decreased functional mobility ,Decreased ROM,Decreased strength,Increased pain  Assessment: Problem List:  1. Decrease active range of motion left knee  2. Decreased strength of the left knee 3.  mild swelling left knee  4. Antalgic gait pattern  5. Pain left knee 6. LEFI score   7. Unable to tolerate regular work duty  Prognosis: Good  Discharge Recommendations: Continue to assess pending progress           PLAN: [] Evaluate and Treat  Frequency/Duration:  Plan  Times per week: 2  Plan weeks: 6  Current Treatment Recommendations: Strengthening,ROM,Pain Management,Modalities,Manual Therapy - Joint Manipulation,Manual Therapy - Soft Tissue Mobilization,Home Exercise Program     Precautions: Other position/activity restrictions: Currently off work, no light duty                  Patient Status:[x] Continue/ Initiate plan of Care    [] Discharge PT. Recommend pt continue with HEP. [] Additional visits requested, Please re-certify for additional visits:          Signature: Electronically signed by Isaak Major PT on 2/23/22 at 8:28 AM EST      If you have any questions or concerns, please don't hesitate to call. Thank you for your referral.    I have reviewed this plan of care and certify a need for medically necessary rehabilitation services.     Physician Signature:__________________________________________________________  Date:  Please sign and return

## 2022-02-25 ENCOUNTER — HOSPITAL ENCOUNTER (OUTPATIENT)
Dept: PHYSICAL THERAPY | Age: 67
Setting detail: THERAPIES SERIES
Discharge: HOME | End: 2022-02-25
Payer: COMMERCIAL

## 2022-02-25 PROCEDURE — 97110 THERAPEUTIC EXERCISES: CPT

## 2022-02-25 PROCEDURE — G0283 ELEC STIM OTHER THAN WOUND: HCPCS

## 2022-02-25 PROCEDURE — 97116 GAIT TRAINING THERAPY: CPT

## 2022-02-25 ASSESSMENT — PAIN DESCRIPTION - PAIN TYPE: TYPE: ACUTE PAIN

## 2022-02-25 ASSESSMENT — PAIN DESCRIPTION - LOCATION: LOCATION: KNEE

## 2022-02-25 ASSESSMENT — PAIN DESCRIPTION - ORIENTATION: ORIENTATION: LEFT;MID;OUTER

## 2022-02-25 ASSESSMENT — PAIN DESCRIPTION - DESCRIPTORS: DESCRIPTORS: ACHING;THROBBING

## 2022-02-25 ASSESSMENT — PAIN SCALES - GENERAL: PAINLEVEL_OUTOF10: 4

## 2022-02-25 NOTE — PROGRESS NOTES
[address] [facility]  Outpatient Physical Therapy    Treatment Note        Date: 2022  Patient: Jb Parent  : 1955  ACCT #: [de-identified]  Referring Practitioner: Sorin Johnson M.D. Diagnosis: Sprain of the left knee  Treatment Diagnosis: Painful and weak left knee with antalgic gait pattern     Visit Information:  PT Visit Information  Onset Date: 22  PT Insurance Information: [facility] [place]  [id number]  Total # of Visits Approved: 12  Total # of Visits to Date: 2  Plan of Care/Certification Expiration Date: 03/15/22  No Show: 0  Canceled Appointment: 0  Progress Note Counter:     Subjective: Pt reports \"my knee is a little sore. \" Pt reports he accidentally \"twisted\" on his knee yesterday putting away groceries. Pt observed to have difficulty w/ sit <-> stand transfers. HEP Compliance:  [x] Good [] Fair [] Poor [] Reports not doing due to:    Vital Signs  Patient Currently in Pain: Yes   Pain Screening  Patient Currently in Pain: Yes  Pain Assessment  Pain Assessment: 0-10  Pain Level: 4  Pain Type: Acute pain  Pain Location: Knee  Pain Orientation: Left;Mid;Outer  Pain Descriptors: Aching; Throbbing    OBJECTIVE:   Exercises  Exercise 1: Quad sets 5''x10  Exercise 2: SLR x10  Exercise 3: heel slides w/ strap supine 5''x10  Exercise 4: SAQ 3''x10  Exercise 5: LAQ 3''x10  Exercise 6: seated hip abd w/ RTB 5''x10; hip add w/ ball 5''x10  Exercise 7: standing weight shifts lateral, ant/post x10 ea  Exercise 8: gait training around dept w/ emphasis on heel-toe and knee flex during swing phase  Exercise 9: discussed stair negotiation (non-reciprocal) \"up w/ the good, down w/ the bad\"  Exercise 20:  HEP weight shifts, hip add/abd    Strength: [x] NT  [] MMT completed:    ROM: [] NT  [x] ROM measurements:  AROM LLE (degrees)  L Knee Flexion 0-145: 85  L Knee Extension 0: -3    Modalities:  Modalities  Cryotherapy (Minutes\Location): concurrent w/ IFC for pain control x10 mins  E-stim (parameters): IFC to L knee post-tx for pain control x10 mins     *Indicates exercise, modality, or manual techniques to be initiated when appropriate    Assessment: Body structures, Functions, Activity limitations: Decreased functional mobility ,Decreased ROM,Decreased strength,Increased pain  Assessment: Initiated tx per POC for increased strength, ROM, decreased pain, and improved ambulation. Pt often anticipates pain w/ each exercise/task requiring cues for dec guarding techniques. Pt challenged w/ gait quality requiring significant increased time to complete correctly. Treatment Diagnosis: Painful and weak left knee with antalgic gait pattern  Prognosis: Good     Goals:  Short term goals  Time Frame for Short term goals: 3-5 treatments  Short term goal 1: Increase flexion to 100 degrees, extension to 0  Short term goal 2: No further soft tissue swelling of the left knee  Short term goal 3: Pain decreased to 2/10  Long term goals  Time Frame for Long term goals : 6-12 treatment  Long term goal 1: Active range of motion of the left knee within normal limits  Long term goal 2: Strength of the left knee 5/5  Long term goal 3: Pain 0/10 to 1/10 left knee  Long term goal 4: Ambulate with reciprical gait with no antalgia  Long term goal 5: LEFI score will indicate no to minimal disabililty  Long term goal 6: Patient will be able to return to full duty  Progress toward goals: inc strength, ROM, dec pain     POST-PAIN       Pain Rating (0-10 pain scale):   4/10   Location and pain description same as pre-treatment unless indicated.    Action: [] NA   [x] Perform HEP  [] Meds as prescribed  [] Modalities as prescribed   [] Call Physician     Frequency/Duration:  Plan  Times per week: 2  Plan weeks: 6  Current Treatment Recommendations: Strengthening,ROM,Pain Management,Modalities,Manual Therapy - Joint Manipulation,Manual Therapy - Soft Tissue Mobilization,Home Exercise Program     Pt to continue current HEP. See objective section for any therapeutic exercise changes, additions or modifications this date.     PT Individual Minutes  Time In: 0804  Time Out: 5453  Minutes: 65  Timed Code Treatment Minutes: 55 Minutes  Procedure Minutes:10     Modality Time In Time Out Total Minutes Units    Ther ex (30467) 530 410 84 3   Gait (33358) 529 099 10 1   CP (00318) 541 198 30 6   JAIDF unattended   ([phone] 1     Signature:  Electronically signed by Niraj Mary PTA on 2/25/22 at 8:15 AM EST

## 2022-02-28 ENCOUNTER — HOSPITAL ENCOUNTER (OUTPATIENT)
Dept: PHYSICAL THERAPY | Age: 67
Setting detail: THERAPIES SERIES
Discharge: HOME | End: 2022-02-28
Payer: COMMERCIAL

## 2022-02-28 PROCEDURE — G0283 ELEC STIM OTHER THAN WOUND: HCPCS

## 2022-02-28 PROCEDURE — 97110 THERAPEUTIC EXERCISES: CPT

## 2022-02-28 PROCEDURE — 97140 MANUAL THERAPY 1/> REGIONS: CPT

## 2022-02-28 ASSESSMENT — PAIN DESCRIPTION - DESCRIPTORS: DESCRIPTORS: ACHING;THROBBING

## 2022-02-28 ASSESSMENT — PAIN SCALES - GENERAL: PAINLEVEL_OUTOF10: 4

## 2022-02-28 ASSESSMENT — PAIN DESCRIPTION - ORIENTATION: ORIENTATION: LEFT;OUTER;MID

## 2022-02-28 ASSESSMENT — PAIN DESCRIPTION - LOCATION: LOCATION: KNEE

## 2022-02-28 ASSESSMENT — PAIN DESCRIPTION - PAIN TYPE: TYPE: ACUTE PAIN

## 2022-02-28 NOTE — PROGRESS NOTES
[address]  Outpatient Physical Therapy    Treatment Note        Date: 2022  Patient: Louisa Diamond  : 1955  ACCT #: [de-identified]  Referring Practitioner: Leroy Kumar M.D. Diagnosis: Sprain of the left knee  Treatment Diagnosis: Painful and weak left knee with antalgic gait pattern     Visit Information:  PT Visit Information  Onset Date: 22  PT Insurance Information: [facility] [address] [place]  [id number]  Total # of Visits Approved: 12  Total # of Visits to Date: 3  Plan of Care/Certification Expiration Date: 03/15/22  No Show: 0  Canceled Appointment: 0  Progress Note Counter: 3/12    Subjective: Pt reports he woke up this AM w/ his knee swollen and increased pain. Pt states \"I think I've been over-doing it a little bit. \" Pt denies significant pain following Friday's tx session. Pt has knee wrapped w/ an ACE bandage today. Pt also wearing slides d/t not being able to get tennis shoes on. Pt observed to have b/l (L>R) LE edema near ankles that pt states is new. Instructed pt that because this is in both LE's to watch and if worsening contact MD.     HEP Compliance:  [x] Good [] Fair [] Poor [] Reports not doing due to:    Vital Signs  Patient Currently in Pain: Yes   Pain Screening  Patient Currently in Pain: Yes  Pain Assessment  Pain Assessment: 0-10  Pain Level: 4  Pain Type: Acute pain  Pain Location: Knee  Pain Orientation: Left; Outer;Mid  Pain Descriptors: Aching; Throbbing    OBJECTIVE:   Exercises  Exercise 1: Quad sets 5''x10  Exercise 2: SLR x10 w/ mild quad lag  Exercise 3: heel slides w/ strap supine 5''x10  Exercise 4: SAQ 3''x10  Exercise 5: LAQ 3''x10  Exercise 6: seated hip abd w/ RTB 5''x10; hip add w/ ball 5''x10    Strength: [x] NT  [] MMT completed:    ROM: [] NT  [x] ROM measurements:  AROM LLE (degrees)  L Knee Flexion 0-145: 91 deg supine     Manual:   Manual therapy  Soft Tissue Mobalization: circumferential massage to LLE starting ankle to knee to dec edema x15 mins total    Modalities:  Modalities  Cryotherapy (Minutes\Location): concurrent w/ IFC for pain control x10 mins  E-stim (parameters): IFC to L knee post-tx for pain control x10 mins     *Indicates exercise, modality, or manual techniques to be initiated when appropriate    Assessment: Body structures, Functions, Activity limitations: Decreased functional mobility ,Decreased ROM,Decreased strength,Increased pain  Assessment: Initiated manual for decreased edema w/ good improvement noted after. Cont'd w/ supine and seated therex w/ emphasis on maintaining tolerable ranges. Concluded w/ IFC and CP supine w/ leg elevated to further dec edema. Treatment Diagnosis: Painful and weak left knee with antalgic gait pattern  Prognosis: Good     Goals:  Short term goals  Time Frame for Short term goals: 3-5 treatments  Short term goal 1: Increase flexion to 100 degrees, extension to 0  Short term goal 2: No further soft tissue swelling of the left knee  Short term goal 3: Pain decreased to 2/10  Long term goals  Time Frame for Long term goals : 6-12 treatment  Long term goal 1: Active range of motion of the left knee within normal limits  Long term goal 2: Strength of the left knee 5/5  Long term goal 3: Pain 0/10 to 1/10 left knee  Long term goal 4: Ambulate with reciprical gait with no antalgia  Long term goal 5: LEFI score will indicate no to minimal disabililty  Long term goal 6: Patient will be able to return to full duty  Progress toward goals: inc strength, ROM, dec pain     POST-PAIN       Pain Rating (0-10 pain scale):   4/10   Location and pain description same as pre-treatment unless indicated.    Action: [] NA   [x] Perform HEP  [] Meds as prescribed  [] Modalities as prescribed   [] Call Physician     Frequency/Duration:  Plan  Times per week: 2  Plan weeks: 6  Current Treatment Recommendations: Strengthening,ROM,Pain Management,Modalities,Manual Therapy - Joint Manipulation,Manual Therapy - Soft Tissue Mobilization,Home Exercise Program     Pt to continue current HEP. See objective section for any therapeutic exercise changes, additions or modifications this date.     PT Individual Minutes  Time In: 1100  Time Out: 7308  Minutes: 68  Timed Code Treatment Minutes: 58 Minutes  Procedure Minutes: 10     Modality Time In Time Out Total Minutes Units    Ther ex (63728) 3286 1096 84 3   Manual Therapy (48658) 2666 7050 15 1   CP (50930) 2780 5986 10 0   Estim unattended   (75769) 2980 4384 10 1     Signature:  Electronically signed by Chuy Barnes PTA on 2/28/22 at 10:59 AM EST

## 2022-03-02 ENCOUNTER — HOSPITAL ENCOUNTER (OUTPATIENT)
Dept: PHYSICAL THERAPY | Age: 67
Setting detail: THERAPIES SERIES
Discharge: HOME | End: 2022-03-02
Payer: COMMERCIAL

## 2022-03-02 PROCEDURE — 97110 THERAPEUTIC EXERCISES: CPT

## 2022-03-02 PROCEDURE — G0283 ELEC STIM OTHER THAN WOUND: HCPCS

## 2022-03-02 ASSESSMENT — PAIN DESCRIPTION - ORIENTATION: ORIENTATION: LEFT

## 2022-03-02 ASSESSMENT — PAIN SCALES - GENERAL: PAINLEVEL_OUTOF10: 4

## 2022-03-02 ASSESSMENT — PAIN DESCRIPTION - DESCRIPTORS: DESCRIPTORS: ACHING

## 2022-03-02 ASSESSMENT — PAIN DESCRIPTION - LOCATION: LOCATION: KNEE

## 2022-03-02 ASSESSMENT — PAIN DESCRIPTION - PAIN TYPE: TYPE: ACUTE PAIN

## 2022-03-02 NOTE — PROGRESS NOTES
[address] [facility]  Outpatient Physical Therapy    Treatment Note        Date: 3/2/2022  Patient: Taran Dinh  : 1955  ACCT #: [de-identified]  Referring Practitioner: Salma Breaux M.D. Diagnosis: Sprain of the left knee  Treatment Diagnosis: Painful and weak left knee with antalgic gait pattern     Visit Information:  PT Visit Information  Onset Date: 22  PT Insurance Information: [facility] [place]  [id number]  Total # of Visits Approved: 12  Total # of Visits to Date: 7.48  Plan of Care/Certification Expiration Date: 03/15/22  No Show: 0  Canceled Appointment: 0  Progress Note Counter:     Subjective: Pain is still about the same as it has been around a 4/10, the swelling in the ankles is gone right now. I don't know if I'm just over doi     HEP Compliance:  [x] Good [] Fair [] Poor [] Reports not doing due to:    Vital Signs  Patient Currently in Pain: Yes   Pain Screening  Patient Currently in Pain: Yes  Pain Assessment  Pain Assessment: 0-10  Pain Level: 4  Pain Type: Acute pain  Pain Location: Knee  Pain Orientation: Left  Pain Descriptors: Aching    OBJECTIVE:   Exercises  Exercise 1: Quad sets 5''x10  Exercise 2: SLR x10 w/ mild quad lag remaining  Exercise 3: heel slides w/ strap supine 5''x10  Exercise 4: SAQ 3''x10  Exercise 5: LAQ 3''x10  Exercise 6: seated hip abd w/ RTB 5''x10; hip add w/ ball 5''x10  Exercise 8: gait training around dept w/ emphasis on heel-toe and knee flex during swing phase  Exercise 10: Sports Art Recumb. Bike 1/2 rev. x 5min.             Strength: [x] NT  [] MMT completed:     ROM: [] NT  [x] ROM measurements:           AROM LLE (degrees)  L Knee Flexion 0-145: 104 deg supine  L Knee Extension 0: -3       Modalities:  Modalities  Cryotherapy (Minutes\Location): concurrent w/ IFC for pain control x10 mins  E-stim (parameters): IFC to L knee post-tx for pain control x10 mins     *Indicates exercise, modality, or manual techniques to be initiated when appropriate    Assessment: Body structures, Functions, Activity limitations: Decreased functional mobility ,Decreased ROM,Decreased strength,Increased pain  Assessment: Improved knee flexion measurments today while knee Ext remained at -3 degrees. Mild edema remains in Left knee, no noted edema in ankles today. Treatment Diagnosis: Painful and weak left knee with antalgic gait pattern  Prognosis: Good       Goals:  Short term goals  Time Frame for Short term goals: 3-5 treatments  Short term goal 1: Increase flexion to 100 degrees, extension to 0  Short term goal 2: No further soft tissue swelling of the left knee  Short term goal 3: Pain decreased to 2/10    Long term goals  Time Frame for Long term goals : 6-12 treatment  Long term goal 1: Active range of motion of the left knee within normal limits  Long term goal 2: Strength of the left knee 5/5  Long term goal 3: Pain 0/10 to 1/10 left knee  Long term goal 4: Ambulate with reciprical gait with no antalgia  Long term goal 5: LEFI score will indicate no to minimal disabililty  Long term goal 6: Patient will be able to return to full duty  Progress toward goals:improved AROM of Left knee     POST-PAIN       Pain Rating (0-10 pain scale):   6/10   Location and pain description same as pre-treatment unless indicated. Action: [] NA   [x] Perform HEP  [] Meds as prescribed  [] Modalities as prescribed   [] Call Physician     Frequency/Duration:  Plan  Times per week: 2  Plan weeks: 6  Current Treatment Recommendations: Strengthening,ROM,Pain Management,Modalities,Manual Therapy - Joint Manipulation,Manual Therapy - Soft Tissue Mobilization,Home Exercise Program     Pt to continue current HEP. See objective section for any therapeutic exercise changes, additions or modifications this date.          PT Individual Minutes  Time In: 0450  Time Out: [address]  Minutes: 57  Timed Code Treatment Minutes: 47 Minutes  Procedure Minutes:IFC/CP x 10 min

## 2022-03-07 ENCOUNTER — HOSPITAL ENCOUNTER (OUTPATIENT)
Dept: PHYSICAL THERAPY | Age: 67
Setting detail: THERAPIES SERIES
Discharge: HOME | End: 2022-03-07
Payer: COMMERCIAL

## 2022-03-07 PROCEDURE — G0283 ELEC STIM OTHER THAN WOUND: HCPCS

## 2022-03-07 PROCEDURE — 97110 THERAPEUTIC EXERCISES: CPT

## 2022-03-07 ASSESSMENT — PAIN DESCRIPTION - PAIN TYPE: TYPE: ACUTE PAIN

## 2022-03-07 ASSESSMENT — PAIN SCALES - GENERAL: PAINLEVEL_OUTOF10: 2

## 2022-03-07 ASSESSMENT — PAIN DESCRIPTION - DESCRIPTORS: DESCRIPTORS: ACHING

## 2022-03-07 ASSESSMENT — PAIN DESCRIPTION - LOCATION: LOCATION: KNEE

## 2022-03-07 ASSESSMENT — PAIN DESCRIPTION - ORIENTATION: ORIENTATION: LEFT

## 2022-03-07 NOTE — PROGRESS NOTES
[address] [facility]  Outpatient Physical Therapy    Treatment Note        Date: 3/7/2022  Patient: David Gagnon  : 1955  ACCT #: [de-identified]  Referring Practitioner: John Reed M.D. Diagnosis: Sprain of the left knee  Treatment Diagnosis: Painful and weak left knee with antalgic gait pattern     Visit Information:  PT Visit Information  Onset Date: 22  PT Insurance Information: [facility] [place] [place]  [id number]  Total # of Visits Approved: 12  Total # of Visits to Date: 5  Plan of Care/Certification Expiration Date: 03/15/22  No Show: 0  Canceled Appointment: 0  Progress Note Counter:     Subjective: Pt reports good complinacne with HEP, has increased to x2 sets with each exercises, been using ice as instructed and it seems to be helping some. HEP Compliance:  [x] Good [] Fair [] Poor [] Reports not doing due to:    Vital Signs  Patient Currently in Pain: Yes   Pain Screening  Patient Currently in Pain: Yes  Pain Assessment  Pain Assessment: 0-10  Pain Level: 2  Pain Type: Acute pain  Pain Location: Knee  Pain Orientation: Left  Pain Descriptors: Aching    OBJECTIVE:   Exercises  Exercise 1: Quad sets 5''x10  Exercise 2: SLR 3-way, Clams   x10  Exercise 3: heel slides w/ strap supine 5''x10  Exercise 4: SAQ 2# 3''x15  Exercise 5: LAQ 2# x10  Exercise 10: Sports Art Recumb. Bike 1/2 rev. x 5min. Exercise 11: SLS 15s hold x 3 Daniel. Exercise 12:  T-Band TKE Yellow 5\" x 10  Exercise 20: HEP: SLR, Clams, Hip ABD,ADD       Strength: [x] NT  [] MMT completed:     ROM: [] NT  [x] ROM measurements:           AROM LLE (degrees)  L Knee Flexion 0-145: 108 deg supine  L Knee Extension 0: -3           Modalities:  Modalities  Cryotherapy (Minutes\Location): concurrent w/ IFC for pain control x10 mins  E-stim (parameters): IFC to L knee post-tx for pain control x10 mins     *Indicates exercise, modality, or manual techniques to be initiated when appropriate    Assessment: Body structures, Functions, Activity limitations: Decreased functional mobility ,Decreased ROM,Decreased strength,Increased pain  Assessment: Knee flexion continues to improve, progression of exercise program with increased focus on strength with mild increased pain and fatigue reported by pt today. Pt remains unable to make full revolution on Bike at this time due to pain and lack of ROM  Treatment Diagnosis: Painful and weak left knee with antalgic gait pattern  Prognosis: Good       Goals:  Short term goals  Time Frame for Short term goals: 3-5 treatments  Short term goal 1: Increase flexion to 100 degrees, extension to 0  Short term goal 2: No further soft tissue swelling of the left knee  Short term goal 3: Pain decreased to 2/10    Long term goals  Time Frame for Long term goals : 6-12 treatment  Long term goal 1: Active range of motion of the left knee within normal limits  Long term goal 2: Strength of the left knee 5/5  Long term goal 3: Pain 0/10 to 1/10 left knee  Long term goal 4: Ambulate with reciprical gait with no antalgia  Long term goal 5: LEFI score will indicate no to minimal disabililty  Long term goal 6: Patient will be able to return to full duty  Progress toward goals:improving AROM     POST-PAIN       Pain Rating (0-10 pain scale):   4/10   Location and pain description same as pre-treatment unless indicated. Action: [] NA   [x] Perform HEP  [] Meds as prescribed  [] Modalities as prescribed   [] Call Physician     Frequency/Duration:  Plan  Times per week: 2  Plan weeks: 6  Current Treatment Recommendations: Strengthening,ROM,Pain Management,Modalities,Manual Therapy - Joint Manipulation,Manual Therapy - Soft Tissue Mobilization,Home Exercise Program     Pt to continue current HEP. See objective section for any therapeutic exercise changes, additions or modifications this date.          PT Individual Minutes  Time In: 5448  Time Out: 5795  Minutes: 58  Timed Code Treatment Minutes: 48 Minutes  Procedure Minutes: IFC/CP 10 min      Modality Time In Time Out Total Minutes Units    Ther ex ([id number]) [phone]   SSGXB unattended   (31813) [phone]     Signature:  Electronically signed by Phill Aguillon PTA on 3/7/22 at 9:56 AM EST

## 2022-03-09 ENCOUNTER — HOSPITAL ENCOUNTER (OUTPATIENT)
Dept: PHYSICAL THERAPY | Age: 67
Setting detail: THERAPIES SERIES
Discharge: HOME | End: 2022-03-09
Payer: COMMERCIAL

## 2022-03-09 PROCEDURE — 97110 THERAPEUTIC EXERCISES: CPT

## 2022-03-09 PROCEDURE — 97140 MANUAL THERAPY 1/> REGIONS: CPT

## 2022-03-09 ASSESSMENT — PAIN SCALES - GENERAL: PAINLEVEL_OUTOF10: 4

## 2022-03-09 ASSESSMENT — PAIN DESCRIPTION - PAIN TYPE: TYPE: ACUTE PAIN

## 2022-03-09 ASSESSMENT — PAIN DESCRIPTION - ORIENTATION: ORIENTATION: LEFT

## 2022-03-09 ASSESSMENT — PAIN DESCRIPTION - DESCRIPTORS: DESCRIPTORS: ACHING

## 2022-03-09 ASSESSMENT — PAIN DESCRIPTION - LOCATION: LOCATION: KNEE

## 2022-03-14 ENCOUNTER — HOSPITAL ENCOUNTER (OUTPATIENT)
Dept: PHYSICAL THERAPY | Age: 67
Setting detail: THERAPIES SERIES
Discharge: HOME | End: 2022-03-14
Payer: COMMERCIAL

## 2022-03-14 PROCEDURE — 97110 THERAPEUTIC EXERCISES: CPT

## 2022-03-14 PROCEDURE — G0283 ELEC STIM OTHER THAN WOUND: HCPCS

## 2022-03-14 ASSESSMENT — PAIN SCALES - GENERAL: PAINLEVEL_OUTOF10: 2

## 2022-03-14 ASSESSMENT — PAIN DESCRIPTION - ORIENTATION: ORIENTATION: LEFT

## 2022-03-14 ASSESSMENT — PAIN DESCRIPTION - DESCRIPTORS: DESCRIPTORS: ACHING;SORE

## 2022-03-14 ASSESSMENT — PAIN DESCRIPTION - PAIN TYPE: TYPE: ACUTE PAIN

## 2022-03-14 ASSESSMENT — PAIN DESCRIPTION - LOCATION: LOCATION: KNEE

## 2022-03-14 NOTE — PROGRESS NOTES
[address] [facility]  Outpatient Physical Therapy    Treatment Note        Date: 3/14/2022  Patient: Claudia Gonzalez  : 1955  ACCT #: [de-identified]  Referring Practitioner: Nicolas Stuart M.D. Diagnosis: Sprain of the left knee  Treatment Diagnosis: Painful and weak left knee with antalgic gait pattern     Visit Information:  PT Visit Information  Onset Date: 22  PT Insurance Information: [facility] [employer] [place]  [id number]  Total # of Visits Approved: 12  Total # of Visits to Date: 7  Plan of Care/Certification Expiration Date: 03/15/22  No Show: 0  Canceled Appointment: 0  Progress Note Counter:     Subjective: Pt arrived to clinic today worried, feels he has had a set back as the knee isn't bending as well as it was before. Comments: Reviewed with pt that T-band TKEs were not assinged to HEP as it had only been performed x1 in therapy and was ready to add to HEP  HEP Compliance:  [x] Good [] Fair [] Poor [] Reports not doing due to:    Vital Signs  Patient Currently in Pain: Yes   Pain Screening  Patient Currently in Pain: Yes  Pain Assessment  Pain Assessment: 0-10  Pain Level: 2  Pain Type: Acute pain  Pain Location: Knee  Pain Orientation: Left  Pain Descriptors: Aching; Sore    OBJECTIVE:   Exercises  Exercise 1: Quad sets 5''x10  Exercise 2: SLR 3-way, Clams   x10  Exercise 3: heel slides w/ strap supine 10''x10  Exercise 4: SAQ  3''x15  Exercise 11: SLS 20s hold x 3 Daniel. Exercise 13: Step Ups 4 inch box Fwd , Lat  x 10 ea. Exercise 14: Sit-stand from 18 inch box + foam AirEx pad x 10  Exercise 15: Step stretch knee flexion 10\" x 5       Strength: [x] NT  [] MMT completed:     ROM: [] NT  [x] ROM measurements:           AROM LLE (degrees)  L Knee Flexion 0-145: 106 pre therapy session, 109 post tx session.  AROM       Modalities:  Modalities  Cryotherapy (Minutes\Location): concurrent w/ IFC for pain control x10 mins  E-stim (parameters): IFC to L knee post-tx for pain control x10 mins     *Indicates exercise, modality, or manual techniques to be initiated when appropriate    Assessment: Body structures, Functions, Activity limitations: Decreased functional mobility ,Decreased ROM,Decreased strength,Increased pain  Assessment: Pt continues to make progress with both ROM and strength as progressions made to exercise program to assist pt with return to work and ADLs activities. Pt continues to be in good compliance with HEP and works hard throughout his therapy sessions. Pt would benefit from continued therapy to address current limitations. Treatment Diagnosis: Painful and weak left knee with antalgic gait pattern  Prognosis: Good       Goals:  Short term goals  Time Frame for Short term goals: 3-5 treatments  Short term goal 1: Increase flexion to 100 degrees, extension to 0  Short term goal 2: No further soft tissue swelling of the left knee  Short term goal 3: Pain decreased to 2/10    Long term goals  Time Frame for Long term goals : 6-12 treatment  Long term goal 1: Active range of motion of the left knee within normal limits  Long term goal 2: Strength of the left knee 5/5  Long term goal 3: Pain 0/10 to 1/10 left knee  Long term goal 4: Ambulate with reciprical gait with no antalgia  Long term goal 5: LEFI score will indicate no to minimal disabililty  Long term goal 6: Patient will be able to return to full duty  Progress toward goals: on going     POST-PAIN       Pain Rating (0-10 pain scale):  2 /10   Location and pain description same as pre-treatment unless indicated. Action: [] NA   [x] Perform HEP  [] Meds as prescribed  [] Modalities as prescribed   [] Call Physician     Frequency/Duration:  Plan  Times per week: 2  Plan weeks: 6  Current Treatment Recommendations: Strengthening,ROM,Pain Management,Modalities,Manual Therapy - Joint Manipulation,Manual Therapy - Soft Tissue Mobilization,Home Exercise Program     Pt to continue current HEP.   See objective section for any therapeutic exercise changes, additions or modifications this date.          PT Individual Minutes  Time In: 7998  Time Out: 1050  Minutes: 59  Timed Code Treatment Minutes: 49 Minutes  Procedure Minutes:     Modality Time In Time Out Total Minutes Units    Ther ex ([id number]) [phone]                        QDWOC unattended   [phone]     Signature:  Electronically signed by Javy Hodges PTA on 3/14/22 at 10:55 AM EDT

## 2022-03-16 ENCOUNTER — HOSPITAL ENCOUNTER (OUTPATIENT)
Dept: PHYSICAL THERAPY | Age: 67
Setting detail: THERAPIES SERIES
Discharge: HOME | End: 2022-03-16
Payer: COMMERCIAL

## 2022-03-16 PROCEDURE — 97110 THERAPEUTIC EXERCISES: CPT

## 2022-03-16 PROCEDURE — G0283 ELEC STIM OTHER THAN WOUND: HCPCS

## 2022-03-16 ASSESSMENT — PAIN SCALES - GENERAL: PAINLEVEL_OUTOF10: 3

## 2022-03-16 ASSESSMENT — PAIN DESCRIPTION - ORIENTATION: ORIENTATION: LEFT

## 2022-03-16 ASSESSMENT — PAIN DESCRIPTION - LOCATION: LOCATION: KNEE

## 2022-03-16 ASSESSMENT — PAIN DESCRIPTION - PAIN TYPE: TYPE: ACUTE PAIN

## 2022-03-16 ASSESSMENT — PAIN DESCRIPTION - DESCRIPTORS: DESCRIPTORS: ACHING;SORE

## 2022-03-16 NOTE — PROGRESS NOTES
[address] [facility]  Outpatient Physical Therapy    Treatment Note        Date: 3/16/2022  Patient: Miguel Ángel Conner  : 1955  ACCT #: [de-identified]  Referring Practitioner: Jroge L Trotter M.D. Diagnosis: Sprain of the left knee  Treatment Diagnosis: Painful and weak left knee with antalgic gait pattern     Visit Information:  PT Visit Information  Onset Date: 22  PT Insurance Information: [facility]  [id number]  Total # of Visits Approved: 12  Plan of Care/Certification Expiration Date: 03/15/22  Progress Note Counter:     Subjective: I'm sore after therapy, the knee still isn't moving well. I've been working on my stretches. Comments: Reviewed with pt that T-band TKEs were not assinged to HEP as it had only been performed x1 in therapy and was ready to add to HEP  HEP Compliance:  [x] Good [] Fair [] Poor [] Reports not doing due to:    Vital Signs  Patient Currently in Pain: Yes   Pain Screening  Patient Currently in Pain: Yes  Pain Assessment  Pain Assessment: 0-10  Pain Level: 3  Pain Type: Acute pain  Pain Location: Knee  Pain Orientation: Left  Pain Descriptors: Aching; Sore    OBJECTIVE:   Exercises  Exercise 1: Quad sets 5''x10 - DC HEP  Exercise 2: SLR 4-way, Clams , prone knee flexion   x10-15  Exercise 3: heel slides w/ strap supine 10''x10 , Weighted Heel slides 4# x 15  Exercise 4: SAQ  3''x15  Exercise 5: LAQ / Seated March L LE 2#, HS Curls RTB  x10-15 ea. Exercise 10: StarTrac Bike Seat 5 L 1 x 5 min. Strength: [x] NT  [] MMT completed:     ROM: [x] NT  [] ROM measurements:        Modalities:  Modalities  Cryotherapy (Minutes\Location): concurrent w/ IFC for pain control x10 mins  E-stim (parameters): IFC to L knee post-tx for pain control x10 mins     *Indicates exercise, modality, or manual techniques to be initiated when appropriate    Assessment:         Body structures, Functions, Activity limitations: Decreased functional mobility ,Decreased ROM,Decreased strength,Increased pain     Treatment Diagnosis: Painful and weak left knee with antalgic gait pattern  Prognosis: Good       Goals:  Short term goals  Time Frame for Short term goals: 3-5 treatments  Short term goal 1: Increase flexion to 100 degrees, extension to 0  Short term goal 2: No further soft tissue swelling of the left knee  Short term goal 3: Pain decreased to 2/10    Long term goals  Time Frame for Long term goals : 6-12 treatment  Long term goal 1: Active range of motion of the left knee within normal limits  Long term goal 2: Strength of the left knee 5/5  Long term goal 3: Pain 0/10 to 1/10 left knee  Long term goal 4: Ambulate with reciprical gait with no antalgia  Long term goal 5: LEFI score will indicate no to minimal disabililty  Long term goal 6: Patient will be able to return to full duty  Progress toward goals:on going     POST-PAIN       Pain Rating (0-10 pain scale):   /10   Location and pain description same as pre-treatment unless indicated. Action: [] NA   [x] Perform HEP  [] Meds as prescribed  [] Modalities as prescribed   [] Call Physician     Frequency/Duration:  Plan  Times per week: 2  Plan weeks: 6  Current Treatment Recommendations: Strengthening,ROM,Pain Management,Modalities,Manual Therapy - Joint Manipulation,Manual Therapy - Soft Tissue Mobilization,Home Exercise Program     Pt to continue current HEP. See objective section for any therapeutic exercise changes, additions or modifications this date.          PT Individual Minutes  Time In: 9256  Time Out: 2695  Minutes: 60  Timed Code Treatment Minutes: 50 Minutes  Procedure Minutes:IFC/CP x 10 min    Modality Time In Time Out Total Minutes Units    Ther ex ([id number]) [phone]                        Estim unattended   [phone]     Signature:  Electronically signed by Ten Shahid PTA on 3/16/22 at 12:11 PM EDT

## 2022-03-21 ENCOUNTER — HOSPITAL ENCOUNTER (OUTPATIENT)
Dept: PHYSICAL THERAPY | Age: 67
Setting detail: THERAPIES SERIES
Discharge: HOME | End: 2022-03-21
Payer: COMMERCIAL

## 2022-03-21 PROCEDURE — G0283 ELEC STIM OTHER THAN WOUND: HCPCS

## 2022-03-21 PROCEDURE — 97110 THERAPEUTIC EXERCISES: CPT

## 2022-03-21 ASSESSMENT — PAIN DESCRIPTION - PAIN TYPE: TYPE: ACUTE PAIN

## 2022-03-21 ASSESSMENT — PAIN DESCRIPTION - DESCRIPTORS: DESCRIPTORS: ACHING;SORE

## 2022-03-21 ASSESSMENT — PAIN DESCRIPTION - LOCATION: LOCATION: KNEE

## 2022-03-21 ASSESSMENT — PAIN DESCRIPTION - ORIENTATION: ORIENTATION: LEFT

## 2022-03-21 ASSESSMENT — PAIN SCALES - GENERAL: PAINLEVEL_OUTOF10: 3

## 2022-03-21 NOTE — PROGRESS NOTES
[address]  Outpatient Physical Therapy    Treatment Note        Date: 3/21/2022  Patient: Severiano Starling  : 1955  ACCT #: [de-identified]  Referring Practitioner: Kenisha Ludwig M.D. Diagnosis: Sprain of the left knee  Treatment Diagnosis: Painful and weak left knee with antalgic gait pattern     Visit Information:  PT Visit Information  Onset Date: 22  PT Insurance Information: [facility] [employer] [place]  [id number]  Total # of Visits Approved: 12  Total # of Visits to Date: 9  Plan of Care/Certification Expiration Date: 03/15/22  No Show: 0  Canceled Appointment: 0  Progress Note Counter:     Subjective: I'm sore today, 3/10 I went for a walk yesterday maybe a little longer than I should have. Comments: Reviewed with pt that T-band TKEs were not assinged to HEP as it had only been performed x1 in therapy and was ready to add to HEP  HEP Compliance:  [x] Good [] Fair [] Poor [] Reports not doing due to:    Vital Signs  Patient Currently in Pain: Yes   Pain Screening  Patient Currently in Pain: Yes  Pain Assessment  Pain Assessment: 0-10  Pain Level: 3  Pain Type: Acute pain  Pain Location: Knee  Pain Orientation: Left  Pain Descriptors: Aching; Sore    OBJECTIVE:   Exercises  Exercise 6: seated hip abd w/ RTB 5''x15; hip add w/ ball 5''x15  Exercise 7: standing Sink Ex. HR, Hib ABD, Ext  x10 ea  Exercise 10: StarTrac Bike Seat 5 L 1 x 5 min. Exercise 13: Step Ups 4 inch box Fwd , Lat  x 10 ea. Exercise 15:  Step stretch knee flexion / HS stretch  20\" x 3         Strength: [] NT  [x] MMT completed:     Strength LLE  L Hip Flexion: 4/5  L Hip Extension: 4/5  L Hip ABduction: 4-/5  L Knee Flexion: 4/5  L Knee Extension: 4/5  L Ankle Dorsiflexion: 4/5  L Ankle Plantar Flexion: 4/5             ROM: [] NT  [x] ROM measurements:           AROM LLE (degrees)  LLE General AROM: SLR 72 deg  L Hip Flexion 0-125: 0-90  L Hip Extension 0-10: 6  L Hip ABduction 0-45: 0-42  L Hip ADduction Exercise Program     Pt to continue current HEP. See objective section for any therapeutic exercise changes, additions or modifications this date. PT Individual Minutes  Time In: 0845  Time Out: 8237  Minutes: 54  Timed Code Treatment Minutes: 44 Minutes  Procedure Minutes: IFC/CP x 10 min.       Modality Time In Time Out Total Minutes Units    Ther ex ([id number]) [phone]   Estim unattended   ([id number]) [phone] 1     Signature:  Electronically signed by Xavi Goodrich PTA on 3/21/22 at 11:54 AM EDT

## 2022-03-22 ENCOUNTER — OFFICE VISIT (OUTPATIENT)
Dept: ORTHOPEDIC SURGERY | Age: 67
End: 2022-03-22
Payer: COMMERCIAL

## 2022-03-22 DIAGNOSIS — S80.02XD CONTUSION OF LEFT KNEE, SUBSEQUENT ENCOUNTER: ICD-10-CM

## 2022-03-22 DIAGNOSIS — M17.0 PRIMARY OSTEOARTHRITIS OF BOTH KNEES: Primary | ICD-10-CM

## 2022-03-22 PROCEDURE — 99213 OFFICE O/P EST LOW 20 MIN: CPT | Performed by: PHYSICIAN ASSISTANT

## 2022-03-23 ENCOUNTER — HOSPITAL ENCOUNTER (OUTPATIENT)
Dept: PHYSICAL THERAPY | Age: 67
Setting detail: THERAPIES SERIES
Discharge: HOME | End: 2022-03-23
Payer: COMMERCIAL

## 2022-03-23 PROCEDURE — G0283 ELEC STIM OTHER THAN WOUND: HCPCS | Performed by: PHYSICAL THERAPIST

## 2022-03-23 PROCEDURE — 97110 THERAPEUTIC EXERCISES: CPT | Performed by: PHYSICAL THERAPIST

## 2022-03-23 RX ORDER — LOSARTAN POTASSIUM 50 MG/1
TABLET ORAL
COMMUNITY
Start: 2022-02-24

## 2022-03-23 ASSESSMENT — PAIN DESCRIPTION - LOCATION: LOCATION: KNEE

## 2022-03-23 ASSESSMENT — PAIN - FUNCTIONAL ASSESSMENT: PAIN_FUNCTIONAL_ASSESSMENT: PREVENTS OR INTERFERES SOME ACTIVE ACTIVITIES AND ADLS

## 2022-03-23 ASSESSMENT — PAIN DESCRIPTION - FREQUENCY: FREQUENCY: CONTINUOUS

## 2022-03-23 ASSESSMENT — PAIN DESCRIPTION - PROGRESSION: CLINICAL_PROGRESSION: NOT CHANGED

## 2022-03-23 ASSESSMENT — PAIN DESCRIPTION - DESCRIPTORS: DESCRIPTORS: ACHING

## 2022-03-23 ASSESSMENT — PAIN SCALES - GENERAL: PAINLEVEL_OUTOF10: 3

## 2022-03-23 ASSESSMENT — PAIN DESCRIPTION - PAIN TYPE: TYPE: ACUTE PAIN

## 2022-03-23 ASSESSMENT — PAIN DESCRIPTION - ORIENTATION: ORIENTATION: LEFT

## 2022-03-23 NOTE — PROGRESS NOTES
[address]  Outpatient Physical Therapy    Treatment Note        Date: 3/23/2022  Patient: Teo Hu  : 1955  ACCT #: [de-identified]  Referring Practitioner: Anahi Soares M.D. Diagnosis: Sprain of the left knee  Treatment Diagnosis: Painful and weak left knee with antalgic gait pattern     Visit Information:  PT Visit Information  Onset Date: 22  PT Insurance Information: [facility] [employer] [place]  [id number]  Total # of Visits Approved: 12  Total # of Visits to Date: 10  Plan of Care/Certification Expiration Date: 04/15/22  No Show: 0  Canceled Appointment: 0  Progress Note Counter: 10/12    Subjective: Patient states that he saw orthopedics yesterday, and they suggested gel shots, but the patient does not want these. HEP Compliance:  [x] Good [] Fair [] Poor [] Reports not doing due to:    Vital Signs  Patient Currently in Pain: Yes   Pain Screening  Patient Currently in Pain: Yes  Pain Assessment  Pain Assessment: 0-10  Pain Level: 3  Patient's Stated Pain Goal: No pain  Pain Type: Acute pain  Pain Location: Knee  Pain Orientation: Left  Pain Descriptors: Aching  Pain Frequency: Continuous  Clinical Progression: Not changed  Functional Pain Assessment: Prevents or interferes some active activities and ADLs    OBJECTIVE:   Exercises  Exercise 1: Quad sets 5''x10 - DC HEP  Exercise 2: SLR 4-way, Clams , prone knee flexion   x10-15  Exercise 3: heel slides w/ strap supine 10''x10 , Weighted Heel slides 4# x 15  Exercise 4: SAQ  3''x 20 with 2#  Exercise 5: LAQ / Seated March L LE 2#, HS Curls RTB  x10-15 ea. Exercise 6: seated hip abd w/ RTB 5''x15; hip add w/ ball 5''x15  Exercise 7: standing Sink Ex. HR, Hib ABD, Ext  x10 ea  Exercise 10: StarTrac Bike Seat 5 L 1 x 5 min. Exercise 11: SLS 20s hold x 3 Daniel. Exercise 12: T-Band TKE Yellow 5\" x 10  Exercise 13: Step Ups 4 inch box Fwd , Lat  x 10 ea.   Exercise 14: Sit-stand from 18 inch box + foam AirEx pad x 10  Exercise 15: Step stretch knee flexion / HS stretch  20\" x 3  Exercise 20: HEP: SLR, Clams, Hip ABD,ADD       Strength: [] NT  [x] MMT completed:     Strength LLE  L Hip Flexion: 4+/5  L Hip Extension: 4+/5  L Hip ABduction: 4/5  L Knee Flexion: 4+/5  L Knee Extension: 4/5  L Ankle Dorsiflexion: 4/5  L Ankle Plantar Flexion: 4/5             ROM: [] NT  [x] ROM measurements:      AROM LLE (degrees)  L Hip Flexion 0-125: 0-90  L Hip Extension 0-10: 6  L Hip ABduction 0-45: 0-42  L Knee Flexion 0-145: 124  L Knee Extension 0: 0     Modalities:  Modalities  Cryotherapy (Minutes\Location): concurrent w/ IFC for pain control x15 mins  E-stim (parameters): IFC to L knee post-tx for pain control x15 mins     *Indicates exercise, modality, or manual techniques to be initiated when appropriate    Assessment:   Activity Tolerance  Activity Tolerance: Patient Tolerated treatment well    Body structures, Functions, Activity limitations: Decreased functional mobility ,Decreased ROM,Decreased strength,Increased pain  Assessment: Patient has met his range of motion goal.  His strength is slowly improving. He still tends to protect his knee will all exercise and with walking. Discussed with him about the next phase of his rehab in which is to add more restistance to his exercise and start work tasks. Will need to request further sessions. Treatment Diagnosis: Painful and weak left knee with antalgic gait pattern  Prognosis: Good  PT Education: Home Exercise Program  Patient Education: continue with exercise at home, and especially the theraband exercises    Goals:  Short term goals  Time Frame for Short term goals: 3-5 treatments  Short term goal 1:  Increase flexion to 100 degrees, extension to 0 (goal met 3/23/22)  Short term goal 2: No further soft tissue swelling of the left knee (goal met 3/23/22)  Short term goal 3: Pain decreased to 2/10    Long term goals  Time Frame for Long term goals : 6-12 treatment  Long term goal 1: Active range of motion of the left knee within normal limits (goal met 3/23/22)  Long term goal 2: Strength of the left knee 5/5  Long term goal 3: Pain 0/10 to 1/10 left knee  Long term goal 4: Ambulate with reciprical gait with no antalgia  Long term goal 5: LEFI score will indicate no to minimal disabililty  Long term goal 6: Patient will be able to return to full duty  Progress toward goals: good and on-going    POST-PAIN       Pain Rating (0-10 pain scale):  3/10   Location and pain description same as pre-treatment unless indicated. Action: [] NA   [x] Perform HEP  [] Meds as prescribed  [] Modalities as prescribed   [] Call Physician     Frequency/Duration:  Plan  Times per week: 2  Plan weeks: 6  Current Treatment Recommendations: Strengthening,ROM,Pain Management,Modalities,Manual Therapy - Joint Manipulation,Manual Therapy - Soft Tissue Mobilization,Home Exercise Program  Plan Comment: Will contact [facility] for another set of treatments  to work on returning him to full duty     Pt to continue current HEP. See objective section for any therapeutic exercise changes, additions or modifications this date.          PT Individual Minutes  Time In: 9803  Time Out: 1200  Minutes: 65  Timed Code Treatment Minutes: 50 Minutes  Procedure Minutes:15 minutes iES   Modality Time In Time Out Total Minutes Units    Ther ex (23995) [phone] 3   Manual Therapy ([id number])       Neuro re-ed ([id number])       Massage ([id number])       Estim unattended   (11044) [phone]             1     Signature:  Electronically signed by Bree Auguste, PT on 3/23/22 at 11:59 AM EDT

## 2022-03-24 NOTE — PROGRESS NOTES
[address] [place] [address] and Sports Medicine      Subjective:      Chief Complaint   Patient presents with    Follow-up     Left knee pain- Pt states PT is helping but still minimum pain , he says he takes aspirin when needed he was prescribed Tramadol but isnt taking because it gives him fatigue. He also ices. He also states there is sometimes swelling        HPI: Haleigh Yoon is a 79 y.o. male who is here for left knee polyp. This occurred after an injury. MRI revealed ACL strain, MCL sprain, medial degenerative changes at the meniscus as well as arthritis. He has been going to therapy which has been working well for him. No past medical history on file. No past surgical history on file. Social History     Socioeconomic History    Marital status: Single     Spouse name: Not on file    Number of children: Not on file    Years of education: Not on file    Highest education level: Not on file   Occupational History    Not on file   Tobacco Use    Smoking status: Never Smoker    Smokeless tobacco: Never Used   Substance and Sexual Activity    Alcohol use: Not Currently     Alcohol/week: 2.0 standard drinks     Types: 2 Cans of beer per week    Drug use: Not Currently    Sexual activity: Not Currently   Other Topics Concern    Not on file   Social History Narrative    Not on file     Social Determinants of Health     Financial Resource Strain:     Difficulty of Paying Living Expenses: Not on file   Food Insecurity:     Worried About Running Out of Food in the Last Year: Not on file    Jhoana of Food in the Last Year: Not on file   Transportation Needs:     Lack of Transportation (Medical): Not on file    Lack of Transportation (Non-Medical):  Not on file   Physical Activity:     Days of Exercise per Week: Not on file    Minutes of Exercise per Session: Not on file   Stress:     Feeling of Stress : Not on file   Social Connections:     Frequency of Communication with Friends and Family: Not on file    Frequency of Social Gatherings with Friends and Family: Not on file    Attends Baptism Services: Not on file    Active Member of Clubs or Organizations: Not on file    Attends Club or Organization Meetings: Not on file    Marital Status: Not on file   Intimate Partner Violence:     Fear of Current or Ex-Partner: Not on file    Emotionally Abused: Not on file    Physically Abused: Not on file    Sexually Abused: Not on file   Housing Stability:     Unable to Pay for Housing in the Last Year: Not on file    Number of [place] in the Last Year: Not on file    Unstable Housing in the Last Year: Not on file     No family history on file. No Known Allergies  Current Outpatient Medications on File Prior to Visit   Medication Sig Dispense Refill    losartan (COZAAR) 50 MG tablet Take by mouth      carvedilol (COREG) 12.5 MG tablet TAKE 1 TABLET TWICE DAILY.  meloxicam (MOBIC) 15 MG tablet Take 1 tablet by mouth daily as needed for Pain (Patient not taking: Reported on 3/23/2022) 30 tablet 0    naproxen (NAPROSYN) 500 MG tablet Take 1 tablet by mouth 2 times daily (with meals) (Patient not taking: Reported on 1/20/2022) 60 tablet 0    aspirin 81 MG tablet Take 81 mg by mouth daily (Patient not taking: Reported on 1/20/2022)      metoprolol tartrate (LOPRESSOR) 25 MG tablet Take 25 mg by mouth 2 times daily (Patient not taking: Reported on 1/20/2022)       No current facility-administered medications on file prior to visit. Objective: There were no vitals taken for this visit. Radiographs and Laboratory Studies:   Previous diagnostic imaging studies were reviewed. Narrative   MRI of the left knee.       HISTORY: Pain and limited range of motion. Per 12/11/2021 electronic medical records \"Patient states that he was at work on Wednesday and had bent over and was lifting parts when he felt a snap in the back of his knee and had difficulty standing\".      COMPARISON: 12/11/2021 left knee x-ray. .       TECHNIQUE: Coronal and sagittal proton-density with fat suppression. Sagittal T2 with fat suppression. Axial T2 with fat suppression. Coronal T1. 3 plane gradient echo localizer. .           FINDINGS:       Moderate to marked anterior diffuse periarticular subcutaneous fat edema.        Vastus medialis and lateralis posterior muscle margin mild edema signal just above the patellar level likely representing mild strain.       Medial femoral condyle and proximal medial tibial diffuse moderate marrow edema-like signal extending to the midline compatible with bone bruising, given the history of recent injury, covering a nearly 5 cm in maximum diameter portion of the femur and 4    cm of the tibia. Tibial linear subchondral decreased signal line paralleling the weightbearing surface may represent trabecular crowding associated with subchondral trabecular impaction or is degenerative sclerosis signal. No significant tibial plateau    depression. Question subtle low signal trabecular crowding or localized bone bruising sparing at the anterior medial femoral condyle. No osteochondral defects.       Medial joint compartment articular cartilage surfaces are relatively well-maintained. There may be very mild tibial cartilage thinning.       Medial meniscus mid body inner third tip truncation either degenerative or associated with recent injury. Posterior horn medial meniscus and adjacent posterior meniscal body faint poorly defined linear intrasubstance signal extending from the outer third    towards the inferior articulating margin of the middle third representing degeneration and/or degenerative tearing. Alternatively meniscal contusion given the medial joint compartment bone bruising. No fluid bright signal within this area to    unequivocally confirm tear.  Posterior medial meniscal capsular structures and semimembranosus tendon tibial expansions are intact.       Medial collateral ligament is intact without discontinuity. Mild reactive edema deep to the ligament likely associated with medial bone bruising described above.       Lateral meniscus is intact. Nonspecific anterior horn lateral meniscal root signal can be accounted for by decussation of the adjacent anterior cruciate ligament fibers. Alternatively degeneration.       Lateral collateral ligament, biceps femoris distal tendon, and iliotibial band are intact.       Lateral joint compartment articular cartilage surfaces relatively well-maintained.       Popliteus musculotendinous junction approximate 6 x 14 mm in diameter area of fluid bright signal compatible with small muscle strain/partial tear. Small amount of fluid extending more superiorly along the tendon sheath. Popliteus tendon near the femoral    attachment has mild-moderate intrasubstance signal but no discontinuity either representing degeneration or tendon strain.       Anterior cruciate ligament mild-moderate intrasubstance signal but a taut morphology without significant fiber discontinuity. Findings may represent mild sprain and/or degenerative signal. Some of the increased signal can relate to adjacent synovium.       Posterior cruciate ligament is intact.       Patellar tendon is intact. Mild intrasubstance signal focally at the patellar attachment is typically degenerative. Mild sprain or tendinosis not excluded. Quadriceps tendon and patellar retinacula intact. No patella patience or [place]'s fat pad edema    likely reactive associated with femoral and tibial bone bruising.       Patellofemoral articular cartilage surfaces relatively well-maintained. Subcentimeter subchondral bone bruising or vessel at the femoral trochlea.       Small knee joint effusion. Mild synovial hypertrophy in the suprapatellar recess. No fluid differential signal level to indicate intra-articular hemorrhage.               Impression       Medial femoral and tibial bone bruising. Subtle medial tibial subchondral fracture versus degenerative sclerosis signal without significant tibial plateau depression.       Medial meniscal posterior horn and body degeneration and/or degenerative tearing, or contusion as detailed above.       Popliteus muscle mild strain at the muscular tendinous junction. Popliteus tendon femoral attachment degeneration or mild strain.       Vastus medialis and lateralis posterior distal muscle mild strain. Extensive anterior subcutaneous fat edema.       Anterior cruciate ligament mild sprain versus degeneration and/or adjacent synovium accounting for mild-moderate intrasubstance signal without sign of discontinuity. Laboratory Studies:   Lab Results   Component Value Date    WBC 7.6 11/22/2019    HGB 15.2 11/22/2019    HCT 44.7 11/22/2019    MCV 89.9 11/22/2019     11/22/2019     No results found for: [address]  No results found for: CRP    Assessment and Plan:      Diagnosis Orders   1. Primary osteoarthritis of both knees     2. Contusion of left knee, subsequent encounter         4 weeks after a knee injury on the left side. ACL was sprained, also has meniscal chronic changes as well as arthritis. We have sent him to therapy and given a brace for this. Both worked wonders for him. He feels much better in terms of his knee pain. Therefore we will hold off on a shot at this point is always discussion with an arthroscopic procedure. We will see him back as needed at this point as his symptoms has resolved     The above plan was discussed in thorough detail with Dr. Love Londono and the patient. No orders of the defined types were placed in this encounter. No orders of the defined types were placed in this encounter. No follow-ups on file.     Rios Brown PA-C  [place] [address] and Sports Medicine  [phone]

## 2022-03-25 VITALS — HEIGHT: 71 IN | BODY MASS INDEX: 28.7 KG/M2 | WEIGHT: 205 LBS

## 2022-03-28 ENCOUNTER — HOSPITAL ENCOUNTER (OUTPATIENT)
Dept: PHYSICAL THERAPY | Age: 67
Setting detail: THERAPIES SERIES
Discharge: HOME | End: 2022-03-28
Payer: COMMERCIAL

## 2022-03-28 PROCEDURE — 97110 THERAPEUTIC EXERCISES: CPT | Performed by: PHYSICAL THERAPIST

## 2022-03-28 PROCEDURE — G0283 ELEC STIM OTHER THAN WOUND: HCPCS | Performed by: PHYSICAL THERAPIST

## 2022-03-28 ASSESSMENT — PAIN DESCRIPTION - ORIENTATION: ORIENTATION: LEFT

## 2022-03-28 ASSESSMENT — PAIN DESCRIPTION - FREQUENCY: FREQUENCY: CONTINUOUS

## 2022-03-28 ASSESSMENT — PAIN SCALES - GENERAL
PAINLEVEL_OUTOF10: 3
PAINLEVEL_OUTOF10: 3

## 2022-03-28 ASSESSMENT — PAIN DESCRIPTION - LOCATION: LOCATION: KNEE

## 2022-03-28 ASSESSMENT — PAIN - FUNCTIONAL ASSESSMENT: PAIN_FUNCTIONAL_ASSESSMENT: PREVENTS OR INTERFERES SOME ACTIVE ACTIVITIES AND ADLS

## 2022-03-28 ASSESSMENT — PAIN DESCRIPTION - PAIN TYPE: TYPE: ACUTE PAIN

## 2022-03-28 ASSESSMENT — PAIN DESCRIPTION - PROGRESSION: CLINICAL_PROGRESSION: NOT CHANGED

## 2022-03-28 ASSESSMENT — PAIN DESCRIPTION - DESCRIPTORS: DESCRIPTORS: ACHING

## 2022-03-28 NOTE — PROGRESS NOTES
[address]  Outpatient Physical Therapy    Treatment Note        Date: 3/28/2022  Patient: Ashley Andino  : 1955  ACCT #: [de-identified]  Referring Practitioner: Airam Jaime M.D. Diagnosis: Sprain of the left knee  Treatment Diagnosis: Painful and weak left knee with antalgic gait pattern     Visit Information:  PT Visit Information  Onset Date: 22  PT Insurance Information: [facility] [employer] [place]  [id number]  Total # of Visits Approved: 12  Total # of Visits to Date: 11  Plan of Care/Certification Expiration Date: 04/15/22  No Show: 0  Canceled Appointment: 0  Progress Note Counter:     Subjective: Patient states that he saw [address] and they have kept him off work, and will be ordering more therapy. HEP Compliance:  [x] Good [] Fair [] Poor [] Reports not doing due to:    Vital Signs  Patient Currently in Pain: Yes   Pain Screening  Patient Currently in Pain: Yes  Pain Assessment  Pain Assessment: 0-10  Pain Level: 3  Patient's Stated Pain Goal: No pain  Pain Type: Acute pain  Pain Location: Knee  Pain Orientation: Left  Pain Descriptors: Aching  Pain Frequency: Continuous  Clinical Progression: Not changed  Functional Pain Assessment: Prevents or interferes some active activities and ADLs    OBJECTIVE:   Exercises  Exercise 1: Quad sets 5''x10 - DC HEP  Exercise 2: SLR 4-way, Clams , prone knee flexion   x10-15  Exercise 3: heel slides w/ strap supine 10''x10 , Weighted Heel slides 4# x 15  Exercise 4: SAQ  3''x 20 with 2#  Exercise 5: LAQ / , HS Curls GTB  x 20 reps  Exercise 7: standing Sink Ex. HR, Hib ABD, Ext  with 2# 20 reps  Exercise 10: StarTrac Bike Seat 5 L 2 x 5 min. Exercise 11: SLS 20s hold x 3 Daniel. Exercise 13: Step Ups 4 inch box Fwd , Lat  x 10 ea.   Exercise 14: Sit-stand from 18 inch box x 10-  Exercise 16: Hooklying ball squeeze, green theraband hip abduction  Exercise 17: Standing heel lifts  10 reps       Strength: [x] NT  [] MMT completed:        ROM: [x] NT  [] ROM measurements:        Modalities:  Modalities  Cryotherapy (Minutes\Location): concurrent w/ IFC for pain control x15 mins  E-stim (parameters): IFC to L knee post-tx for pain control x15 mins     *Indicates exercise, modality, or manual techniques to be initiated when appropriate    Assessment:   Activity Tolerance  Activity Tolerance: Patient was able to tolerate weighted standing exercises today. Body structures, Functions, Activity limitations: Decreased functional mobility ,Decreased ROM,Decreased strength,Increased pain  Assessment: He requires further strengthening and work simulation tasks. He needs encouragement to not \"protect\" his knee so much. Treatment Diagnosis: Painful and weak left knee with antalgic gait pattern  Prognosis: Good  Patient Education: continue with exercise at home, and especially the theraband exercises    Goals:  Short term goals  Time Frame for Short term goals: 3-5 treatments  Short term goal 1: Increase flexion to 100 degrees, extension to 0 (goal met 3/23/22)  Short term goal 2: No further soft tissue swelling of the left knee (goal met 3/23/22)  Short term goal 3: Pain decreased to 2/10    Long term goals  Time Frame for Long term goals : 6-12 treatment  Long term goal 1: Active range of motion of the left knee within normal limits (goal met 3/23/22)  Long term goal 2: Strength of the left knee 5/5  Long term goal 3: Pain 0/10 to 1/10 left knee  Long term goal 4: Ambulate with reciprical gait with no antalgia  Long term goal 5: LEFI score will indicate no to minimal disabililty  Long term goal 6: Patient will be able to return to full duty  Progress toward goals: Fair due to his complaint of pain    POST-PAIN       Pain Rating (0-10 pain scale):  2/10   Location and pain description same as pre-treatment unless indicated.    Action: [] NA   [x] Perform HEP  [] Meds as prescribed  [] Modalities as prescribed   [] Call Physician Frequency/Duration:  Plan  Times per week: 2  Plan weeks: 6  Plan Comment: Discussed with POR the goals for returning to work. She has requested more therapy and is in agreement about return to work goals. Pt to continue current HEP. See objective section for any therapeutic exercise changes, additions or modifications this date.          PT Individual Minutes  Time In: 1000  Time Out: 1564  Minutes: 73  Timed Code Treatment Minutes: 58 Minutes  Procedure Minutes:15 minutes IES     Modality Time In Time Out Total Minutes Units    Ther ex (21916) 1000 1058 58 4   Manual Therapy ([id number])       Neuro re-ed ([id number])       Massage ([id number])       Estim unattended   ([id number]) [phone]     Signature:  Electronically signed by Neena Chi PT on 3/28/22 at 3:27 PM EDT

## 2022-03-30 ENCOUNTER — HOSPITAL ENCOUNTER (OUTPATIENT)
Dept: PHYSICAL THERAPY | Age: 67
Setting detail: THERAPIES SERIES
Discharge: HOME | End: 2022-03-30
Payer: COMMERCIAL

## 2022-03-30 PROCEDURE — G0283 ELEC STIM OTHER THAN WOUND: HCPCS | Performed by: PHYSICAL THERAPIST

## 2022-03-30 PROCEDURE — 97110 THERAPEUTIC EXERCISES: CPT | Performed by: PHYSICAL THERAPIST

## 2022-03-30 ASSESSMENT — PAIN DESCRIPTION - FREQUENCY: FREQUENCY: CONTINUOUS

## 2022-03-30 ASSESSMENT — PAIN DESCRIPTION - PROGRESSION: CLINICAL_PROGRESSION: NOT CHANGED

## 2022-03-30 ASSESSMENT — PAIN DESCRIPTION - LOCATION: LOCATION: KNEE

## 2022-03-30 ASSESSMENT — PAIN DESCRIPTION - PAIN TYPE: TYPE: ACUTE PAIN

## 2022-03-30 ASSESSMENT — PAIN SCALES - GENERAL: PAINLEVEL_OUTOF10: 3

## 2022-03-30 ASSESSMENT — PAIN DESCRIPTION - ORIENTATION: ORIENTATION: LEFT

## 2022-03-30 ASSESSMENT — PAIN - FUNCTIONAL ASSESSMENT: PAIN_FUNCTIONAL_ASSESSMENT: PREVENTS OR INTERFERES SOME ACTIVE ACTIVITIES AND ADLS

## 2022-03-30 ASSESSMENT — PAIN DESCRIPTION - DESCRIPTORS: DESCRIPTORS: ACHING

## 2022-03-30 NOTE — PROGRESS NOTES
[address]  Outpatient Physical Therapy    Treatment Note        Date: 3/30/2022  Patient: David Gagnon  : 1955  ACCT #: [de-identified]  Referring Practitioner: John Reed M.D. Diagnosis: Sprain of the left knee  Treatment Diagnosis: Painful and weak left knee with antalgic gait pattern     Visit Information:  PT Visit Information  Onset Date: 22  PT Insurance Information: [facility] [employer] [place]  [id number]  Total # of Visits Approved: 12  Total # of Visits to Date:   Plan of Care/Certification Expiration Date: 04/15/22  No Show: 0  Canceled Appointment: 0  Progress Note Counter:     Subjective: Patient states his knee is sore today. HEP Compliance:  [x] Good [] Fair [] Poor [] Reports not doing due to:    Vital Signs  Patient Currently in Pain: Yes   Pain Screening  Patient Currently in Pain: Yes  Pain Assessment  Pain Assessment: 0-10  Pain Level: 3  Patient's Stated Pain Goal: No pain  Pain Type: Acute pain  Pain Location: Knee  Pain Orientation: Left  Pain Descriptors: Aching  Pain Frequency: Continuous  Clinical Progression: Not changed  Functional Pain Assessment: Prevents or interferes some active activities and ADLs    OBJECTIVE:   Exercises  Exercise 2: SLR 4-way, Clams , prone knee flexion   x10-15  Exercise 3: heel slides w/ strap supine 10''x10 , Weighted Heel slides 4# x 15  Exercise 4: SAQ  3''x 20 with 4#  Exercise 5: LAQ 4# / , HS Curls GTB  x 20 reps  Exercise 6: Carry 10# buckets 4 times  Exercise 12: Squats against the wall  Exercise 13: Step Ups 6 inch box Fwd , Lat  x 10 ea.   Exercise 14: Sit-stand from 18 inch box x 10-  Exercise 16: Hooklying ball squeeze, green theraband hip abduction, bridging 20 reps  Exercise 18: Push/pull sled empty 3 times  Exercise 19: Lift box (empty ) 12 inch to knucke and waist       Strength: [x] NT  [] MMT completed:        ROM: [x] NT  [] ROM measurements:           Modalities:  Modalities  Cryotherapy (Minutes\Location): concurrent w/ IFC for pain control x15 mins  E-stim (parameters): IFC to L knee post-tx for pain control x15 mins     *Indicates exercise, modality, or manual techniques to be initiated when appropriate    Assessment:   Activity Tolerance  Activity Tolerance: Patient was able to tolerate some work simulation activity without complaint of increased pain. Body structures, Functions, Activity limitations: Decreased functional mobility ,Decreased ROM,Decreased strength,Increased pain  Assessment: An additional authorization has been submitted and will wait for authorization. He needs to focus on work simulation tasks so he can be returned to regular work duty. Treatment Diagnosis: Painful and weak left knee with antalgic gait pattern  Prognosis: Good  Patient Education: continue with exercise at home, and especially the theraband exercises    Goals:  Short term goals  Time Frame for Short term goals: 3-5 treatments  Short term goal 1: Increase flexion to 100 degrees, extension to 0 (goal met 3/23/22)  Short term goal 2: No further soft tissue swelling of the left knee (goal met 3/23/22)  Short term goal 3: Pain decreased to 2/10    Long term goals  Time Frame for Long term goals : 6-12 treatment  Long term goal 1: Active range of motion of the left knee within normal limits (goal met 3/23/22)  Long term goal 2: Strength of the left knee 5/5  Long term goal 3: Pain 0/10 to 1/10 left knee  Long term goal 4: Ambulate with reciprical gait with no antalgia  Long term goal 5: LEFI score will indicate no to minimal disabililty  Long term goal 6: Patient will be able to return to full duty  Progress toward goals: good and on-going    POST-PAIN       Pain Rating (0-10 pain scale):  2 /10   Location and pain description same as pre-treatment unless indicated.    Action: [] NA   [x] Perform HEP  [] Meds as prescribed  [] Modalities as prescribed   [] Call Physician     Frequency/Duration:  Plan  Times per week: 2  Plan weeks: 6  Plan Comment: On hold until additional authorization is received. Pt to continue current HEP. See objective section for any therapeutic exercise changes, additions or modifications this date.          PT Individual Minutes  Time In: 1000  Time Out: 1100  Minutes: 60  Timed Code Treatment Minutes: 45 Minutes  Procedure Minutes:15 minutes IES     Modality Time In Time Out Total Minutes Units    Ther ex ([id number]) 1000 2521 72 3   Manual Therapy ([id number])       Neuro re-ed ([id number])       Massage ([id number])       Estim unattended   ([id number]) [phone]     Signature:  Electronically signed by Briana Cleaning PT on 3/30/22 at 5:32 PM EDT

## 2022-03-30 NOTE — PROGRESS NOTES
restrictions, off work [] yes  [x] no       [] yes  [] no        Body structures, Functions, Activity limitations: Decreased functional mobility ,Decreased ROM,Decreased strength,Increased pain  Assessment: An additional authorization has been submitted and will wait for authorization. He needs to focus on work simulation tasks so he can be returned to regular work duty. Prognosis: Good  Discharge Recommendations: Continue to assess pending progress      Patient Education: continue with exercise at home, and especially the theraband exercises    PLAN: [] Evaluate and Treat  Frequency/Duration:  Plan  Times per week: 2  Plan weeks: 6  Plan Comment: On hold until additional authorization is received. Precautions:                            Patient Status:[] Continue/ Initiate plan of Care    [] Discharge PT. Recommend pt continue with HEP. [x] Additional visits requested, Please re-certify for additional visits:          Signature: Electronically signed by Tip Machado PT on 3/30/22 at 5:33 PM EDT      If you have any questions or concerns, please don't hesitate to call. Thank you for your referral.    I have reviewed this plan of care and certify a need for medically necessary rehabilitation services.     Physician Signature:__________________________________________________________  Date:  Please sign and return

## 2022-04-05 ENCOUNTER — HOSPITAL ENCOUNTER (OUTPATIENT)
Dept: PHYSICAL THERAPY | Age: 67
Setting detail: THERAPIES SERIES
Discharge: HOME | End: 2022-04-05
Payer: COMMERCIAL

## 2022-04-05 PROCEDURE — G0283 ELEC STIM OTHER THAN WOUND: HCPCS

## 2022-04-05 PROCEDURE — 97110 THERAPEUTIC EXERCISES: CPT

## 2022-04-05 ASSESSMENT — PAIN DESCRIPTION - PAIN TYPE: TYPE: ACUTE PAIN

## 2022-04-05 ASSESSMENT — PAIN DESCRIPTION - LOCATION: LOCATION: KNEE

## 2022-04-05 ASSESSMENT — PAIN DESCRIPTION - ORIENTATION: ORIENTATION: LEFT

## 2022-04-05 ASSESSMENT — PAIN SCALES - GENERAL: PAINLEVEL_OUTOF10: 2

## 2022-04-05 ASSESSMENT — PAIN DESCRIPTION - DESCRIPTORS: DESCRIPTORS: ACHING

## 2022-04-05 NOTE — PROGRESS NOTES
[address] [facility]  Outpatient Physical Therapy    Treatment Note        Date: 2022  Patient: Michael Stapleton  : 1955  ACCT #: [de-identified]  Referring Practitioner: Jacob Anna M.D. Diagnosis: Sprain of the left knee  Treatment Diagnosis: Painful and weak left knee with antalgic gait pattern     Visit Information:  PT Visit Information  Onset Date: 22  PT Insurance Information: [facility] [employer] [place]  [id number]  Total # of Visits Approved: 12  Total # of Visits to Date: 15  Plan of Care/Certification Expiration Date: 04/15/22  No Show: 0  Canceled Appointment: 0  Progress Note Counter:     Subjective: Pt reports he's been compliant w/ HEP and has been doing things around the house     HEP Compliance:  [x] Good [] Fair [] Poor [] Reports not doing due to:    Vital Signs  Patient Currently in Pain: Yes   Pain Screening  Patient Currently in Pain: Yes  Pain Assessment  Pain Assessment: 0-10  Pain Level: 2  Pain Type: Acute pain  Pain Location: Knee  Pain Orientation: Left  Pain Descriptors: Aching    OBJECTIVE:   Exercises  Exercise 6: Carry 15# buckets 30' 6 times  Exercise 7: standing 4-way hip YTB x10 b/l  Exercise 10: StarTrac Bike Seat 5 L3 x 5 min.   Exercise 12: Squats against the wall x10  Exercise 16: going into half kneel w/ R knee on ground then back up x5 w/ UE support  Exercise 17: pt carrying ladder, climbing up and down x5 (first three steps) - leading w/ RLE as pt significantly challenged w/ leading w/ LLE  Exercise 18: Push/pull sled add 25# 3 times  Exercise 19: Lift box (empty) 12 inch to knucke and waist then lateral x6 - cues for body mechanics    Strength: [x] NT  [] MMT completed:     ROM: [x] NT  [] ROM measurements:     Modalities:  Modalities  Cryotherapy (Minutes\Location): concurrent w/ IFC for pain control x10 mins  E-stim (parameters): IFC to L knee post-tx for pain control x10 mins     *Indicates exercise, modality, or manual techniques to be initiated when appropriate    Assessment: Body structures, Functions, Activity limitations: Decreased functional mobility ,Decreased ROM,Decreased strength,Increased pain  Assessment: Tx focus primarily on work-related tasks and strengthening in weight bearing that will benefit pt's return to work tolerance. Pt does continue to have pain in L knee throughout session, though is able to work through it w/ some modifications and cues. Treatment Diagnosis: Painful and weak left knee with antalgic gait pattern  Prognosis: Good     Goals:  Short term goals  Time Frame for Short term goals: 3-5 treatments  Short term goal 1: Increase flexion to 100 degrees, extension to 0 (goal met 3/23/22)  Short term goal 2: No further soft tissue swelling of the left knee (goal met 3/23/22)  Short term goal 3: Pain decreased to 2/10  Long term goals  Time Frame for Long term goals : 6-12 treatment  Long term goal 1: Active range of motion of the left knee within normal limits (goal met 3/23/22)  Long term goal 2: Strength of the left knee 5/5  Long term goal 3: Pain 0/10 to 1/10 left knee  Long term goal 4: Ambulate with reciprical gait with no antalgia  Long term goal 5: LEFI score will indicate no to minimal disabililty  Long term goal 6: Patient will be able to return to full duty  Progress toward goals: focus on return to work duties; inc strength    POST-PAIN       Pain Rating (0-10 pain scale):   2/10   Location and pain description same as pre-treatment unless indicated. Action: [] NA   [x] Perform HEP  [] Meds as prescribed  [] Modalities as prescribed   [] Call Physician     Frequency/Duration:  Plan  Times per week: 2  Plan weeks: 6  Current Treatment Recommendations: Strengthening,ROM,Pain Management,Modalities,Manual Therapy - Joint Manipulation,Manual Therapy - Soft Tissue Mobilization,Home Exercise Program     Pt to continue current HEP.   See objective section for any therapeutic exercise changes, additions or modifications this date.       PT Individual Minutes  Time In: 1640  Time Out: 4771  Minutes: 54  Timed Code Treatment Minutes: 44 Minutes  Procedure Minutes: 10     Modality Time In Time Out Total Minutes Units    Ther ex ([id number]) 5550 2996 84 3   CP (63288) 9353 3726 10 0   Estim unattended   ([id number]) 7342 9219 10 1     Signature:  Electronically signed by Sandoval Garcia PTA on 4/5/22 at 4:44 PM EDT

## 2022-04-07 ENCOUNTER — HOSPITAL ENCOUNTER (OUTPATIENT)
Dept: PHYSICAL THERAPY | Age: 67
Setting detail: THERAPIES SERIES
Discharge: HOME | End: 2022-04-07
Payer: COMMERCIAL

## 2022-04-07 PROCEDURE — G0283 ELEC STIM OTHER THAN WOUND: HCPCS

## 2022-04-07 PROCEDURE — 97110 THERAPEUTIC EXERCISES: CPT

## 2022-04-07 ASSESSMENT — PAIN DESCRIPTION - DESCRIPTORS: DESCRIPTORS: ACHING

## 2022-04-07 ASSESSMENT — PAIN DESCRIPTION - LOCATION: LOCATION: KNEE

## 2022-04-07 ASSESSMENT — PAIN DESCRIPTION - PROGRESSION: CLINICAL_PROGRESSION: GRADUALLY IMPROVING

## 2022-04-07 ASSESSMENT — PAIN DESCRIPTION - ORIENTATION: ORIENTATION: LEFT

## 2022-04-07 ASSESSMENT — PAIN SCALES - GENERAL: PAINLEVEL_OUTOF10: 2

## 2022-04-07 ASSESSMENT — PAIN DESCRIPTION - FREQUENCY: FREQUENCY: CONTINUOUS

## 2022-04-07 NOTE — PROGRESS NOTES
[address] [facility]  Outpatient Physical Therapy    Treatment Note        Date: 2022  Patient: Sonya Stephens  : 1955  ACCT #: [de-identified]  Referring Practitioner: Lj Frances M.D. Diagnosis: Sprain of the left knee        Visit Information:  PT Visit Information  Onset Date: 22  PT Insurance Information: [address] [facility] [employer] [place]  [id number]  Total # of Visits Approved: 12  Total # of Visits to Date: 15  Plan of Care/Certification Expiration Date: 04/15/22  No Show: 0  Canceled Appointment: 0  Progress Note Counter: additional visits approved     Subjective: Reports been compliant with ex's. Currently in knee pain 2/10. Knee usually painful after doing ex's for rest of day. Pt reports his job required him to squat & on knees about 70% of the day. HEP Compliance:  [x] Good [] Fair [] Poor [] Reports not doing due to:    Vital Signs  Patient Currently in Pain: Yes   Pain Screening  Patient Currently in Pain: Yes  Pain Assessment  Pain Assessment: 0-10  Pain Level: 2  Pain Location: Knee  Pain Orientation: Left  Pain Descriptors: Aching  Pain Frequency: Continuous  Clinical Progression: Gradually improving    OBJECTIVE:   Exercises  Exercise 7: standing 4-way hip YTB x10 b/l  Exercise 10: StarTrac Bike Seat 5 L6 x 5 min. (pt asked to increase resistance on bike today)  Exercise 12: Squats against the wall x10 2 sets  pt needed VC not to go down too far, because could not come back up without compensating and using R LE >LLE  Exercise 13: Step Ups 6 inch box Fwd , Lat  x 10 ea. Exercise 14: Sit-stand from 18 inch box x 10-  needed VC not to shift weight towards RLE unpon  standing  Exercise 15:  Step stretch knee flexion / HS stretch  20\" x 3  Exercise 16: going into half kneel w/ R knee on ground then back up x5 w/ UE support  Exercise 17: pt carrying ladder, climbing up and down x7 (first three steps) - leading w/ RLE as pt significantly challenged w/ leading w/ LLE  Exercise 18: Push/pull sled add 25# 4 times  Exercise 19: Lift box with 10# weight to waist height then transfer to table and back-VC for body mechanics still needed x 8 reps       Strength: [x] NT  [] MMT completed:     ROM: [x] NT  [] ROM measurements:      Manual:        Modalities:  Modalities  Cryotherapy (Minutes\Location): concurrent w/ IFC for pain control x10 mins  E-stim (parameters): IFC to L knee post-tx for pain control x10 mins     *Indicates exercise, modality, or manual techniques to be initiated when appropriate    Assessment:   Activity Tolerance  Activity Tolerance: Patient Tolerated treatment well  Activity Tolerance: Pt was able to tolerate increased reps with work simulation activities today and increased resistnace on bike without increase in knee pain    Body structures, Functions, Activity limitations: Decreased functional mobility ,Decreased ROM,Decreased strength,Increased pain  Assessment: Continued to focus on work simulation activities and increased endurance. Pt performs all exercises slowly and with control. Reports knee pain has decreased when turning in any direction while standing-used to cause a sharp pain. Prognosis: Good       Goals:  Short term goals  Time Frame for Short term goals: 3-5 treatments  Short term goal 1:  Increase flexion to 100 degrees, extension to 0 (goal met 3/23/22)  Short term goal 2: No further soft tissue swelling of the left knee (goal met 3/23/22)  Short term goal 3: Pain decreased to 2/10    Long term goals  Time Frame for Long term goals : 6-12 treatment  Long term goal 1: Active range of motion of the left knee within normal limits (goal met 3/23/22)  Long term goal 2: Strength of the left knee 5/5  Long term goal 3: Pain 0/10 to 1/10 left knee  Long term goal 4: Ambulate with reciprical gait with no antalgia  Long term goal 5: LEFI score will indicate no to minimal disabililty  Long term goal 6: Patient will be able to return to full duty  Progress toward goals: work simulation ex's, LE strengthening ex's    POST-PAIN       Pain Rating (0-10 pain scale):  0 /10 immediately after IFC/CP  Location and pain description same as pre-treatment unless indicated. Action: [] NA   [x] Perform HEP  [] Meds as prescribed  [] Modalities as prescribed   [] Call Physician     Frequency/Duration:  Plan  Times per week: 2  Plan weeks: 6  Current Treatment Recommendations: Strengthening,ROM,Pain Management,Modalities,Manual Therapy - Joint Manipulation,Manual Therapy - Soft Tissue Mobilization,Home Exercise Program     Pt to continue current HEP. See objective section for any therapeutic exercise changes, additions or modifications this date.          PT Individual Minutes  Time In: 1055  Time Out: 1155  Minutes: 60  Timed Code Treatment Minutes: 45 Minutes  Procedure Minutes:IFC with CP 10 min   Modality Time In Time Out Total Minutes Units    Ther ex [id number]) 2600 1373 84 3     Signature:  Electronically signed by Eric Saravia on 4/7/22 at 11:51 AM EDT

## 2022-04-12 ENCOUNTER — HOSPITAL ENCOUNTER (OUTPATIENT)
Dept: PHYSICAL THERAPY | Age: 67
Setting detail: THERAPIES SERIES
Discharge: HOME | End: 2022-04-12
Payer: COMMERCIAL

## 2022-04-12 PROCEDURE — G0283 ELEC STIM OTHER THAN WOUND: HCPCS

## 2022-04-12 PROCEDURE — 97110 THERAPEUTIC EXERCISES: CPT

## 2022-04-12 ASSESSMENT — PAIN DESCRIPTION - ORIENTATION: ORIENTATION: LEFT

## 2022-04-12 ASSESSMENT — PAIN DESCRIPTION - FREQUENCY: FREQUENCY: CONTINUOUS

## 2022-04-12 ASSESSMENT — PAIN DESCRIPTION - PAIN TYPE: TYPE: ACUTE PAIN

## 2022-04-12 ASSESSMENT — PAIN DESCRIPTION - LOCATION: LOCATION: KNEE

## 2022-04-12 ASSESSMENT — PAIN DESCRIPTION - PROGRESSION: CLINICAL_PROGRESSION: GRADUALLY IMPROVING

## 2022-04-12 ASSESSMENT — PAIN SCALES - GENERAL: PAINLEVEL_OUTOF10: 2

## 2022-04-12 ASSESSMENT — PAIN DESCRIPTION - DESCRIPTORS: DESCRIPTORS: ACHING

## 2022-04-12 NOTE — PROGRESS NOTES
for any therapeutic exercise changes, additions or modifications this date.     PT Individual Minutes  Time In: 0845  Time Out: 5061  Minutes: 65  Timed Code Treatment Minutes: 50 Minutes  Procedure Minutes: 15     Modality Time In Time Out Total Minutes Units   Ther ex ([id number]) [id number] 5140 11 3   HILJASWINDER unattended   [phone] 15 1       Signature:  Electronically signed by Suhas Gottlieb PTA on 4/12/22 at 9:45 AM EDT

## 2022-04-14 ENCOUNTER — HOSPITAL ENCOUNTER (OUTPATIENT)
Dept: PHYSICAL THERAPY | Age: 67
Setting detail: THERAPIES SERIES
Discharge: HOME | End: 2022-04-14
Payer: COMMERCIAL

## 2022-04-14 PROCEDURE — 97110 THERAPEUTIC EXERCISES: CPT | Performed by: PHYSICAL THERAPIST

## 2022-04-14 ASSESSMENT — PAIN DESCRIPTION - PROGRESSION: CLINICAL_PROGRESSION: GRADUALLY IMPROVING

## 2022-04-14 NOTE — PROGRESS NOTES
[address]  Outpatient Physical Therapy    Treatment Note        Date: 2022  Patient: Marino Fleischer  : 1955  ACCT #: [de-identified]  Referring Practitioner: Jenny Quiñones M.D. Diagnosis: Sprain of the left knee  Treatment Diagnosis: Painful and weak left knee with antalgic gait pattern     Visit Information:  PT Visit Information  Onset Date: 22  PT Insurance Information: [facility] [employer] [place]  [id number]  Total # of Visits Approved: 12  Plan of Care/Certification Expiration Date: 22  Progress Note Counter:     Subjective: Patient reports that he feels good this morning and is not having any pain of his knee     HEP Compliance:  [x] Good [] Fair [] Poor [] Reports not doing due to:    Vital Signs  Patient Currently in Pain: Denies   Pain Screening  Patient Currently in Pain: Denies  Pain Assessment  Clinical Progression: Gradually improving    OBJECTIVE:   Exercises  Exercise 2: SLR 4-way, Clams , prone knee flexion   x10-15  Exercise 3: heel slides w/ strap supine 10''x10 , Weighted Heel slides 4# x 15  Exercise 4: SAQ  3''x 20 with 4#  Exercise 5: LAQ 4# / , HS Curls GTB  x 20 reps  Exercise 6: Carry 15# buckets 30' 6 times  Exercise 7: standing 4-way hip red x10 b/l  Exercise 10: StarTrac Bike Seat 5 L7 x 5 min. (pt asked to increase resistance on bike today)  Exercise 13: Step Ups 8 inch box Fwd , Lat  x 10 ea.   Exercise 14: Sit-stand from 18 inch box x 10-  needed VC not to shift weight towards RLE unpon  standing  Exercise 16: going into half kneel w/ R knee on ground then back up x5 w/ UE support  Exercise 19: Lift box with 15# weight to waist height then transfer to table and back-VC for body mechanics still needed x 8 reps       Strength: [] NT  [x] MMT completed:     Strength LLE  L Hip Flexion: 4+/5  L Hip Extension: 4+/5  L Hip ABduction: 4+/5  L Knee Flexion: 4+/5  L Knee Extension: 4+/5  L Ankle Dorsiflexion: 4+/5  L Ankle Plantar Flexion: 4+/5 ROM: [] NT  [x] ROM measurements:      AROM LLE (degrees)  L Knee Flexion 0-145: 124  L Knee Extension 0: 0           Modalities:  Modalities  Cryotherapy (Minutes\Location): Ice to left knee after exercise     *Indicates exercise, modality, or manual techniques to be initiated when appropriate    Assessment:   Activity Tolerance  Activity Tolerance: Patient Tolerated treatment well  Activity Tolerance: Patient was able to tolerate an increase in weights for the work simulation activities. Body structures, Functions, Activity limitations: Decreased functional mobility ,Decreased ROM,Decreased strength,Increased pain  Assessment: Patient will be returning to work on 4/18/2022) on modified duty. He is increasing in his work tolerances. Will see him also after returning to work to continue to increase his work tolerances. Treatment Diagnosis: Painful and weak left knee with antalgic gait pattern  Prognosis: Good  PT Education: Home Exercise Program  Patient Education: continue with exercise at home, and especially the theraband exercises    Goals:  Short term goals  Time Frame for Short term goals: 3-5 treatments  Short term goal 1:  Increase flexion to 100 degrees, extension to 0 (goal met 3/23/22)  Short term goal 2: No further soft tissue swelling of the left knee (goal met 3/23/22)  Short term goal 3: Pain decreased to 2/10  (goal met 4/14/22)    Long term goals  Time Frame for Long term goals : 6-12 treatment  Long term goal 1: Active range of motion of the left knee within normal limits (goal met 3/23/22)  Long term goal 2: Strength of the left knee 5/5  Long term goal 3: Pain 0/10 to 1/10 left knee (goal met 4/14/2022)  Long term goal 4: Ambulate with reciprical gait with no antalgia  Long term goal 5: LEFI score will indicate no to minimal disabililty  Long term goal 6: Patient will be able to return to full duty  (returning to light duty on 4/18/22)  Progress toward goals: good and om-going    POST-PAIN       Pain Rating (0-10 pain scale):  2 /10   Location and pain description same as pre-treatment unless indicated. Action: [] NA   [x] Perform HEP  [] Meds as prescribed  [] Modalities as prescribed   [] Call Physician     Frequency/Duration:  Plan  Plan weeks: 6  Current Treatment Recommendations: [address] Exercise Program     Pt to continue current HEP. See objective section for any therapeutic exercise changes, additions or modifications this date.          PT Individual Minutes  Time In: 1100  Time Out: 8300  Minutes: 55  Timed Code Treatment Minutes: 45 Minutes  Procedure Minutes: 10 minutes cold pack     Modality Time In Time Out Total Minutes Units    Ther ex ([id number]) 1100 1145 45 3   Manual Therapy ([id number])       Neuro re-ed (27984)       Massage ([id number])       Estim unattended   ([id number])         Signature:  Electronically signed by Irwin Laguna, PT on 4/14/22 at 11:54 AM EDT

## 2022-04-19 ENCOUNTER — HOSPITAL ENCOUNTER (OUTPATIENT)
Dept: PHYSICAL THERAPY | Age: 67
Setting detail: THERAPIES SERIES
Discharge: HOME | End: 2022-04-19
Payer: COMMERCIAL

## 2022-04-19 PROCEDURE — 97110 THERAPEUTIC EXERCISES: CPT

## 2022-04-19 ASSESSMENT — PAIN DESCRIPTION - ORIENTATION: ORIENTATION: LEFT

## 2022-04-19 ASSESSMENT — PAIN DESCRIPTION - PROGRESSION: CLINICAL_PROGRESSION: GRADUALLY IMPROVING

## 2022-04-19 ASSESSMENT — PAIN SCALES - GENERAL: PAINLEVEL_OUTOF10: 2

## 2022-04-19 ASSESSMENT — PAIN DESCRIPTION - DESCRIPTORS: DESCRIPTORS: ACHING

## 2022-04-19 ASSESSMENT — PAIN DESCRIPTION - PAIN TYPE: TYPE: ACUTE PAIN

## 2022-04-19 ASSESSMENT — PAIN DESCRIPTION - LOCATION: LOCATION: KNEE

## 2022-04-19 NOTE — PROGRESS NOTES
[address] [facility]  Outpatient Physical Therapy    Treatment Note        Date: 2022  Patient: Kylie Cast  : 1955  ACCT #: [de-identified]  Referring Practitioner: Bertis Holter M.D. Diagnosis: Sprain of the left knee  Treatment Diagnosis: Painful and weak left knee with antalgic gait pattern     Visit Information:  PT Visit Information  Onset Date: 22  PT Insurance Information: [facility] [employer] [place]  [id number]  Total # of Visits Approved: 12  Total # of Visits to Date: 12  Plan of Care/Certification Expiration Date: 22  No Show: 0  Canceled Appointment: 0  Progress Note Counter:     Subjective: Pt reports pain of 2/10 upon arrival to therapy, returned to work yesterday, still on limited duty due to work restrictions, still trying to get my doctor to release me to full work duties. HEP Compliance:  [x] Good [] Fair [] Poor [] Reports not doing due to:    Vital Signs  Patient Currently in Pain: Yes   Pain Screening  Patient Currently in Pain: Yes  Pain Assessment  Pain Assessment: 0-10  Pain Level: 2  Pain Type: Acute pain  Pain Location: Knee  Pain Orientation: Left  Pain Descriptors: Aching  Clinical Progression: Gradually improving    OBJECTIVE:   Exercises  Exercise 6: Carry 15# buckets 30' 6 times  Exercise 7: standing 4-way hip red x10 b/l  Exercise 10: StarTrac Bike Seat 5 L7 x 5 min. Exercise 11: SLS 20s hold x 3 Daniel. Exercise 12: Squats against the wall x10  Exercise 13: Step Ups 8 inch box Fwd , Lat  x 10 ea. Exercise 14: Sit-stand from 18 inch box x 10-  Improved equal WB throught Daniel LE with minimal cues. Exercise 15: Step stretch knee flexion / HS stretch  20\" x 3  Exercise 16: going into half kneel w/ R knee on ground then back up x10 w/ UE support  Exercise 17: pt carrying ladder, climbing up and down x7 (first three steps) - Vertical ladder today x 3 steps in reciprocal pattern.   Exercise 18: Push/pull sled add 25# 4 times  Exercise 19: Lift box with 20# weight to waist height then transfer to table and back x 10 reps       Strength: [x] NT  [] MMT completed:    ROM: [x] NT  [] ROM measurements:     Modalities:  Modalities  Cryotherapy (Minutes\Location): Ice to left knee after exercise     *Indicates exercise, modality, or manual techniques to be initiated when appropriate    Assessment: Body structures, Functions, Activity limitations: Decreased functional mobility ,Decreased ROM,Decreased strength,Increased pain  Assessment: Increased soreness reported today with exercises, pt able to work through pain levels, progressed work duties to vertical ladder climb today with fair tolerance. Reviewed improved body mechanics with pt as pt presents with quad dominate squats, encouraged glute activated squats with improved tolerance with decreased pain reported. Treatment Diagnosis: Painful and weak left knee with antalgic gait pattern  Prognosis: Good       Goals:  Short term goals  Time Frame for Short term goals: 3-5 treatments  Short term goal 1: Increase flexion to 100 degrees, extension to 0 (goal met 3/23/22)  Short term goal 2: No further soft tissue swelling of the left knee (goal met 3/23/22)  Short term goal 3: Pain decreased to 2/10  (goal met 4/14/22)    Long term goals  Time Frame for Long term goals : 6-12 treatment  Long term goal 1: Active range of motion of the left knee within normal limits (goal met 3/23/22)  Long term goal 2: Strength of the left knee 5/5  Long term goal 3: Pain 0/10 to 1/10 left knee (goal met 4/14/2022)  Long term goal 4: Ambulate with reciprical gait with no antalgia  Long term goal 5: LEFI score will indicate no to minimal disabililty  Long term goal 6: Patient will be able to return to full duty  (returning to light duty on 4/18/22)  Progress toward goals:    POST-PAIN       Pain Rating (0-10 pain scale):  2 /10   Location and pain description same as pre-treatment unless indicated.    Action: [] NA   [x] Perform HEP  [] Meds as prescribed  [] Modalities as prescribed   [] Call Physician     Frequency/Duration:  Plan  Times per week: 2  Plan weeks: 6  Current Treatment Recommendations: Strengthening,ROM,Pain Management,Modalities,Manual Therapy - Joint Manipulation,Manual Therapy - Soft Tissue Mobilization,Home Exercise Program     Pt to continue current HEP. See objective section for any therapeutic exercise changes, additions or modifications this date.          PT Individual Minutes  Time In: 7361  Time Out: 9668  Minutes: 58  Timed Code Treatment Minutes: 48 Minutes  Procedure Minutes: 10 CP post exercise pain control      Modality Time In Time Out Total Minutes Units    Ther ex ([id number]) [id number] 1078 79 3     Signature:  Electronically signed by Sajan Rosa PTA on 4/19/22 at 1:56 PM EDT

## 2022-04-21 ENCOUNTER — HOSPITAL ENCOUNTER (OUTPATIENT)
Dept: PHYSICAL THERAPY | Age: 67
Setting detail: THERAPIES SERIES
Discharge: HOME | End: 2022-04-21
Payer: COMMERCIAL

## 2022-04-21 PROCEDURE — G0283 ELEC STIM OTHER THAN WOUND: HCPCS | Performed by: PHYSICAL THERAPIST

## 2022-04-21 PROCEDURE — 97110 THERAPEUTIC EXERCISES: CPT | Performed by: PHYSICAL THERAPIST

## 2022-04-21 ASSESSMENT — PAIN SCALES - GENERAL: PAINLEVEL_OUTOF10: 1

## 2022-04-21 ASSESSMENT — PAIN DESCRIPTION - PAIN TYPE: TYPE: ACUTE PAIN

## 2022-04-21 ASSESSMENT — PAIN - FUNCTIONAL ASSESSMENT: PAIN_FUNCTIONAL_ASSESSMENT: PREVENTS OR INTERFERES SOME ACTIVE ACTIVITIES AND ADLS

## 2022-04-21 ASSESSMENT — PAIN DESCRIPTION - LOCATION: LOCATION: KNEE

## 2022-04-21 ASSESSMENT — PAIN DESCRIPTION - DESCRIPTORS: DESCRIPTORS: ACHING

## 2022-04-21 ASSESSMENT — PAIN DESCRIPTION - ORIENTATION: ORIENTATION: LEFT

## 2022-04-21 ASSESSMENT — PAIN DESCRIPTION - FREQUENCY: FREQUENCY: CONTINUOUS

## 2022-04-21 NOTE — PROGRESS NOTES
[facility]  Outpatient Physical Therapy    Treatment Note        Date: 2022  Patient: John Lema  : 1955   Confirmed: Yes  MRN: [id number]  Referring Provider: Jericho Anderson MD   Secondary Referring Provider (If applicable):     Medical Diagnosis: Sprain of unspecified site of left knee, subsequent encounter [E63.92XD] Sprain of the left knee  Treatment Diagnosis: Painful and weak left knee with antalgic gait pattern    Visit Information:  Insurance: Payor: [address] [place] [address] Nw / Plan: [address] [place] [address] COMP / Product Type: *No Product type* /   PT Visit Information  Onset Date: 22  PT Insurance Information: [facility] [place] [employer] [place]  [id number]  Total # of Visits Approved: 12  Total # of Visits to Date:   Plan of Care/Certification Expiration Date: 22  No Show: 0  Canceled Appointment: 0  Progress Note Counter:     Subjective Information:  Subjective: Patient states that he is back to work, working a little lighter duty. He states he is a little sore  HEP Compliance:  [x] Good [] Fair [] Poor [] Reports not doing due to:    Pain Screening  Patient Currently in Pain: Yes  Pain Level: 1  Patient's Stated Pain Goal: 0 - No pain  Pain Type: Acute pain  Pain Location: Knee  Pain Orientation: Left  Pain Descriptors: Aching  Pain Frequency: Continuous  Functional Pain Assessment: Prevents or interferes some active activities and ADLs    Treatment:  Exercises:  Exercises  Exercise 3: Apollo leg press 10# 20 reps  Exercise 5: Apollo leg extension and flexion 10# 20 reps  Exercise 6: Carry 15# buckets 30' 6 times  Exercise 7: standing 4-way hip red x10 b/l  Exercise 10: StarTrac Bike Seat 5 L7 x 8 min. Exercise 13: Step Ups 8 inch box Fwd , Lat  x 10 ea.   Exercise 16: going into half kneel w/ R knee on ground then back up x10 w/ UE support  Exercise 17: Vertical ladder climb 3 steps up and down reciprical pattern 3 times  Exercise 18: Push/pull sled add 0# 4 times  Exercise 19: Lift box with 20# weight to waist height then transfer to table and back x 10 reps           Modalities:  Modalities  Modalities: Yes  Cryotherapy (CPT 36170)  Number Minutes Cryotherapy: 15 in conjunction with IES  Cryotherapy location: Left  Electric stimulation, unattended (CPT 52033) /  (Medicare)  Patient Position: Seated  E-stim location: Knee  Unbillable time (minutes): 0    *Indicates exercise, modality, or manual techniques to be initiated when appropriate    Objective Measures:      Strength: [] NT  [x] MMT completed:     Strength LLE  L Hip Flexion: 4+/5  L Hip Extension: 4+/5  L Hip ABduction: 4+/5  L Knee Flexion: 4+/5  L Knee Extension: 4+/5             ROM: [] NT  [x] ROM measurements:     AROM LLE (degrees)  L Hip Flexion 0-125: 0-100  L Hip Extension 0-10: 10  L Hip ABduction 0-45: 0-45  L Hip ADduction 0-10: 0-10  L Knee Flexion 0-145: 124  L Knee Extension 0: 0          Assessment: Body Structures, Functions, Activity Limitations Requiring Skilled Therapeutic Intervention: Decreased functional mobility ,Decreased ROM,Decreased strength,Increased pain  Assessment: Patient reported some soreness of his knee today, however he is back to work. Discussed with him about the expectations of returning to work after being off so long. He was able to tolerate the work simulation tasks without complaint. He remains cautious during all of his movements and needs encouragement at times to perform the tasks.   Treatment Diagnosis: Painful and weak left knee with antalgic gait pattern  Therapy Prognosis: Good  PT Education: Home Exercise Program  Patient Education: continue with exercise at home, and especially the theraband exercises  Activity Tolerance  Activity Tolerance Comments: Patient was able to perform the strengthening exercises on the Apollo equipment today without complaint    Post-Pain Assessment:       Pain Rating (0-10 pain scale):  0 /10   Location and pain description same as pre-treatment unless indicated. Action: [] NA   [x] Perform HEP  [] Meds as prescribed  [] Modalities as prescribed   [] Call Physician     GOALS   Patient Goal(s): Patient goals : To feel better    Short Term Goals Completed by 3-5 treatments Goal Status   Increase flexion to 100 degrees, extension to 0 (goal met 3/23/22) Met     Met     Met                   Long Term Goals Completed by 6-12 treatment Goal Status   Active range of motion of the left knee within normal limits (goal met 3/23/22) Met   Strength of the left knee 5/5 Not Met   Pain 0/10 to 1/10 left knee (goal met 4/14/2022) Met   Ambulate with reciprical gait with no antalgia In progress   LEFI score will indicate no to minimal disabililty Unable to assess                 Plan:  Frequency/Duration:  Plan  Plan Frequency: 2 times weekly  Plan weeks: 6  Current Treatment Recommendations: Strengthening,ROM,Manual Therapy - Soft Tissue Mobilization,Manual Therapy - Joint Manipulation,Return to work related activity,Home exercise program,Pain management,Modalities  Pt to continue current HEP. See objective section for any therapeutic exercise changes, additions or modifications this date.     Therapy Time:      PT Individual Minutes  Time In: 5904  Time Out: 1400  Minutes: 55  Timed Code Treatment Minutes: 40 Minutes  Procedure Minutes: 15 minutes IES    Timed Activity Minutes code In/out Units   Ther Ex [id number]            Electronically signed by Ally Briseno PT on 4/21/22 at 4:17 PM EDT

## 2022-04-26 ENCOUNTER — HOSPITAL ENCOUNTER (OUTPATIENT)
Dept: PHYSICAL THERAPY | Age: 67
Setting detail: THERAPIES SERIES
Discharge: HOME | End: 2022-04-26
Payer: COMMERCIAL

## 2022-04-26 PROCEDURE — 97110 THERAPEUTIC EXERCISES: CPT

## 2022-04-26 ASSESSMENT — PAIN SCALES - GENERAL: PAINLEVEL_OUTOF10: 0

## 2022-04-26 ASSESSMENT — PAIN DESCRIPTION - ORIENTATION: ORIENTATION: LEFT

## 2022-04-26 ASSESSMENT — PAIN DESCRIPTION - DESCRIPTORS: DESCRIPTORS: ACHING

## 2022-04-26 ASSESSMENT — PAIN DESCRIPTION - PAIN TYPE: TYPE: ACUTE PAIN

## 2022-04-26 ASSESSMENT — PAIN DESCRIPTION - LOCATION: LOCATION: KNEE

## 2022-04-26 NOTE — PROGRESS NOTES
[facility]  Outpatient Physical Therapy    Treatment Note        Date: 2022  Patient: Kylie Cast  : 1955   Confirmed: Yes  MRN: [id number]  Referring Provider: Ashli Mathews MD   Secondary Referring Provider:     Medical Diagnosis: Sprain of unspecified site of left knee, subsequent encounter [S83.92XD]    Treatment Diagnosis: Painful and weak left knee with antalgic gait pattern    Visit Information:  Insurance: Payor: [address] [place] [address] Nw / Plan: [address] [place] [address] COMP / Product Type: *No Product type* /   PT Visit Information  Onset Date: 22  Total # of Visits Approved: 12  Total # of Visits to Date: 25  Plan of Care/Certification Expiration Date: 22  No Show: 0  Canceled Appointment: 0  Progress Note Counter:     Subjective Information:  Subjective: Pt reports having to work against his restrictions at work as employer is having him do work tasks that go against his restrictions. HEP Compliance:  [x] Good [] Fair [] Poor [] Reports not doing due to:    Pain Screening  Patient Currently in Pain: Denies  Pain Assessment: 0-10  Pain Level: 0  Worst Pain Level: 4  Pain Type: Acute pain  Pain Location: Knee  Pain Orientation: Left  Pain Descriptors: Aching    Objective Measures:        Strength: [] NT  [] MMT completed:     ROM: [] NT  [] ROM measurements:        Treatment:  Exercises:  Exercises  Exercise 3: Apollo leg press 20# 2x10 reps  Exercise 5: Apollo leg extension  and flexion 30# 20 reps  Exercise 6: Carry 15# buckets (20# hand weight) 30' 6 times  Exercise 7: standing 4-way hip red x10 b/l  Exercise 9: T-band Resistance walking with therapist provided resistance retro, and Fwd each. 30' x 4 ea. Exercise 10: StarTrac Bike Seat 5 L7 x 10 min. Exercise 11: SLS AirEx foam  30s hold x 3 Daniel. Exercise 12: Squats against the wall x10  Exercise 13: Step Ups 8 inch box Fwd , Lat moving right<>Left  x 10 ea.   Exercise 14: Sit-stand from 18 inch box x 10 with 6# ball  Exercise 16: going into half kneel w/ R knee on ground then back up x10 w/ UE support  Exercise 17: Vertical ladder climb 3 steps up and down reciprical pattern 3 times      Assessment: Body Structures, Functions, Activity Limitations Requiring Skilled Therapeutic Intervention: Decreased functional mobility ,Decreased ROM,Decreased strength,Increased pain  Assessment: Continue to focus tx session on high levels strengthening exercises and work simulated tasks. Progression of resistance and weights today to further challenge pt's strength and endurance levels. Treatment Diagnosis: Painful and weak left knee with antalgic gait pattern  Therapy Prognosis: Good          Post-Pain Assessment:       Pain Rating (0-10 pain scale):  0 /10   Location and pain description same as pre-treatment unless indicated. Action: [] NA   [x] Perform HEP  [] Meds as prescribed  [] Modalities as prescribed   [] Call Physician     GOALS   Patient Goal(s): Patient goals : To feel better  Short Term Goals Completed by 3-5 treatments Goal Status   STG 1. Increase flexion to 100 degrees, extension to 0 (goal met 3/23/22)     STG 2. STG 3. STG 4. STG 5. Long Term Goals Completed by 6-12 treatment Goal Status   LTG 1. Active range of motion of the left knee within normal limits (goal met 3/23/22)     LTG 2. Strength of the left knee 5/5 In progress   LTG 3. Pain 0/10 to 1/10 left knee (goal met 4/14/2022)     LTG 4. Ambulate with reciprical gait with no antalgia In progress   LTG 5. LEFI score will indicate no to minimal disabililty In progress   LTG 6.              Plan:  Frequency/Duration:  Plan  Plan Frequency: 2 times weekly  Plan weeks: 6  Current Treatment Recommendations: Strengthening,ROM,Manual Therapy - Soft Tissue Mobilization,Manual Therapy - Joint Manipulation,Return to work related activity,Home exercise program,Pain management,Modalities  Pt to continue current HEP. See objective section for any therapeutic exercise changes, additions or modifications this date.     Therapy Time:      PT Individual Minutes  Time In: 1300  Time Out: 7171  Minutes: 65  Timed Code Treatment Minutes: 55 Minutes  Procedure Minutes:10 CP post exercises sorness        Modality Time In Time Out Total Minutes Units    Ther ex (11633) [id number] 90 4       Electronically signed by Gnunar Hart PTA on 4/26/22 at 3:59 PM EDT

## 2022-04-28 ENCOUNTER — HOSPITAL ENCOUNTER (OUTPATIENT)
Dept: PHYSICAL THERAPY | Age: 67
Setting detail: THERAPIES SERIES
Discharge: HOME | End: 2022-04-28
Payer: COMMERCIAL

## 2022-04-28 PROCEDURE — 97110 THERAPEUTIC EXERCISES: CPT

## 2022-04-28 NOTE — PROGRESS NOTES
[facility]  Outpatient Physical Therapy    Treatment Note        Date: 2022  Patient: Radha Jasso  : 1955   Confirmed: Yes  MRN: [id number]  Referring Provider: Willie Crocker MD   Secondary Referring Provider:     Medical Diagnosis: Sprain of unspecified site of left knee, subsequent encounter [S83.92XD]    Treatment Diagnosis: Painful and weak left knee with antalgic gait pattern    Visit Information:  Insurance: Payor: [address] [place] [address] Nw / Plan: [address] [place] [address] COMP / Product Type: *No Product type* /   PT Visit Information  Onset Date: 22  Total # of Visits Approved: 12  Total # of Visits to Date:   Plan of Care/Certification Expiration Date: 22  No Show: 0  Canceled Appointment: 0  Progress Note Counter:     Subjective Information:  Subjective: A little sore following the last visit, nothing a little ice and some rest didn't cure. HEP Compliance:  [x] Good [] Fair [] Poor [] Reports not doing due to:    Pain Screening  Patient Currently in Pain: Denies    Objective Measures:        Strength: [] NT  [] MMT completed:     ROM: [] NT  [] ROM measurements:        Treatment:  Exercises:  Exercises  Exercise 5: Apollo leg extension 20#   and flexion 30# 20 reps  Exercise 7: standing 4-way hip red x10 b/l  Exercise 9: T-band Resistance walking 4-way x 5 ea. Exercise 10: StarTrac Bike Seat 5 L8 x 10 min. Exercise 11: SLS AirEx foam  30s hold x 3 Daniel. Finger Tip touch needed to maintain balance. Exercise 13: Step Ups 8 inch box Fwd , Lat moving right<>Left  x 10 ea. Exercise 14: Sit-stand from 18 inch box x 10 with 10#  Exercise 18: Push/pull sled x 4 times 0#, 10#, 20#, 30# progressing weight with each attempt. Assessment:    Body Structures, Functions, Activity Limitations Requiring Skilled Therapeutic Intervention: Decreased functional mobility ,Decreased ROM,Decreased strength,Increased pain  Assessment: Pt struggles with increased knee pain reported with deeper squats as pt displayed quad dominate squat form nearing parallel position. Reviewed improved  to avoid pain levels. Treatment Diagnosis: Painful and weak left knee with antalgic gait pattern  Therapy Prognosis: Good  Patient Education: Edu. pt on proper body mechanics with squating to avoid increased knee stain stress         Post-Pain Assessment:       Pain Rating (0-10 pain scale):  3 /10   Location and pain description same as pre-treatment unless indicated. Action: [] NA   [x] Perform HEP  [] Meds as prescribed  [] Modalities as prescribed   [] Call Physician     GOALS   Patient Goal(s): Patient goals : To feel better  Short Term Goals Completed by 3-5 treatments Goal Status   STG 1. Increase flexion to 100 degrees, extension to 0 (goal met 3/23/22)     STG 2. STG 3. STG 4. STG 5. Long Term Goals Completed by 6-12 treatment Goal Status   LTG 1. Active range of motion of the left knee within normal limits (goal met 3/23/22)     LTG 2. Strength of the left knee 5/5 In progress   LTG 3. Pain 0/10 to 1/10 left knee (goal met 4/14/2022)     LTG 4. Ambulate with reciprical gait with no antalgia In progress   LTG 5. LEFI score will indicate no to minimal disabililty In progress   LTG 6. Plan:  Frequency/Duration:  Plan  Plan Frequency: 2 times weekly  Plan weeks: 6  Current Treatment Recommendations: Strengthening,ROM,Manual Therapy - Soft Tissue Mobilization,Manual Therapy - Joint Manipulation,Return to work related activity,Home exercise program,Pain management,Modalities  Pt to continue current HEP. See objective section for any therapeutic exercise changes, additions or modifications this date.     Therapy Time:      PT Individual Minutes  Time In: 1300  Time Out: 1406  Minutes: 66  Timed Code Treatment Minutes: 56 Minutes  Procedure Minutes:       Modality Time In Time Out Total Minutes Units    Ther ex ([id number]) 1300 1356 56 4     Electronically signed by Chino Jackman PTA on 4/28/22 at 2:50 PM EDT

## 2022-05-03 ENCOUNTER — HOSPITAL ENCOUNTER (OUTPATIENT)
Dept: PHYSICAL THERAPY | Age: 67
Setting detail: THERAPIES SERIES
Discharge: HOME | End: 2022-05-03
Payer: COMMERCIAL

## 2022-05-03 PROCEDURE — 97110 THERAPEUTIC EXERCISES: CPT | Performed by: PHYSICAL THERAPIST

## 2022-05-03 ASSESSMENT — PAIN DESCRIPTION - ORIENTATION: ORIENTATION: LEFT

## 2022-05-03 ASSESSMENT — PAIN DESCRIPTION - FREQUENCY: FREQUENCY: CONTINUOUS

## 2022-05-03 ASSESSMENT — PAIN DESCRIPTION - LOCATION: LOCATION: KNEE

## 2022-05-03 ASSESSMENT — PAIN SCALES - GENERAL: PAINLEVEL_OUTOF10: 2

## 2022-05-03 ASSESSMENT — PAIN DESCRIPTION - PAIN TYPE: TYPE: CHRONIC PAIN

## 2022-05-03 ASSESSMENT — PAIN DESCRIPTION - DESCRIPTORS: DESCRIPTORS: ACHING

## 2022-05-03 ASSESSMENT — PAIN - FUNCTIONAL ASSESSMENT: PAIN_FUNCTIONAL_ASSESSMENT: PREVENTS OR INTERFERES SOME ACTIVE ACTIVITIES AND ADLS

## 2022-05-03 NOTE — PROGRESS NOTES
[facility]  Outpatient Physical Therapy    Treatment Note        Date: 5/3/2022  Patient: oCnnie Arita  : 1955   Confirmed: Yes  MRN: [id number]  Referring Provider: Quincy Cueto MD   Secondary Referring Provider (If applicable):     Medical Diagnosis: Sprain of unspecified site of left knee, subsequent encounter [S83.92XD] Sprain of the left knee  Treatment Diagnosis: Painful and weak left knee with antalgic gait pattern    Visit Information:  Insurance: Payor: [address] [place] [address] Nw / Plan: [address] [place] [address] COMP / Product Type: *No Product type* /   PT Visit Information  Onset Date: 22  PT Insurance Information: [facility] [employer] [place]  [id number]  Total # of Visits Approved: 12  Total # of Visits to Date:   Plan of Care/Certification Expiration Date: 22  No Show: 0  Canceled Appointment: 0  Progress Note Counter:     Subjective Information:  Subjective: Patient states that his knee is sore, mostly medial aspect of left knee. He is doing pretty well at work. HEP Compliance:  [x] Good [] Fair [] Poor [] Reports not doing due to:    Pain Screening  Patient Currently in Pain: Yes  Pain Assessment: 0-10  Pain Level: 2  Post Treatment Pain Level: 0  Patient's Stated Pain Goal: 0 - No pain  Pain Type: Chronic pain  Pain Location: Knee  Pain Orientation: Left  Pain Descriptors: Aching  Pain Frequency: Continuous  Functional Pain Assessment: Prevents or interferes some active activities and ADLs  Aggravating factors: Walking,Kneeling  Pain Management/Relieving Factors: Ice,Rest    Treatment:  Exercises:  Exercises  Exercise 3: Apollo leg press 20# 2x10 reps  Exercise 5: Apollo leg extension 30#   and flexion 30# 20 reps  Exercise 7: standing 4-way hip green x10 b/l  Exercise 9: T-band Resistance walking 4-way x 5 ea. Exercise 10: Recombent bike level 5 for 10 minutes  Exercise 11: SLS AirEx foam  30s hold x 3 Daniel.  Finger Tip touch needed to maintain balance. Exercise 13: Step Ups 8 inch box Fwd , Lat moving right<>Left  x 10 ea. Exercise 14: Sit-stand from 18 inch box x 10 with 15#  Exercise 15: Step stretch knee flexion / HS stretch  20\" x 3       Modalities:  Cold pack to right knee after ex          *Indicates exercise, modality, or manual techniques to be initiated when appropriate    Objective Measures:         Strength: [x] NT  [] MMT completed:     ROM: [x] NT  [] ROM measurements:        Assessment: Body Structures, Functions, Activity Limitations Requiring Skilled Therapeutic Intervention: Decreased functional mobility ,Decreased ROM,Decreased strength,Increased pain  Assessment: Patient is progressing well, still complaining of mild pain when he comes into therapy, but upon completion of therapy for the day he reports his knee pain is better. Treatment Diagnosis: Painful and weak left knee with antalgic gait pattern  Therapy Prognosis: Good  Activity Tolerance  Activity Tolerance Comments: Patient able to increase in weight on knee extension machine, theraband, and weight for sit to stand exercises    Patient Education: PT Education: Home Exercise Program  Patient Education: Edu. pt on proper body mechanics with squating to avoid increased knee stain stress    Post-Pain Assessment:       Pain Rating (0-10 pain scale):  0 /10   Location and pain description same as pre-treatment unless indicated. Action: [] NA   [x] Perform HEP  [] Meds as prescribed  [] Modalities as prescribed   [] Call Physician     GOALS   Patient Goal(s): Patient goals :  To feel better    Short Term Goals Completed by 3-5 treatments Goal Status   STG 1 Increase flexion to 100 degrees, extension to 0 (goal met 3/23/22) Met   STG 2 No further soft tissue swelling of the left knee (goal met 3/23/22) Met   STG 3 Pain decreased to 2/10  (goal met 4/14/22) Met   STG 4       STG 5           Long Term Goals Completed by 6-12 treatment Goal Status   LTG 1 Active range of motion of the left knee within normal limits (goal met 3/23/22) Met   LTG 2 Strength of the left knee 5/5 In progress   LTG 3 Pain 0/10 to 1/10 left knee (goal met 4/14/2022) Met   LTG 4 Ambulate with reciprical gait with no antalgia In progress   LTG 5 LEFI score will indicate no to minimal disabililty In progress   LTG 6 Long term goal 6: Patient will be able to return to full duty  (returning to light duty on 4/18/22)             Plan:  Frequency/Duration:  Plan  Plan Frequency: 2 times weekly  Current Treatment Recommendations: [address] [facility] to work related activity,Home exercise program,Pain management,Modalities  Pt to continue current HEP. See objective section for any therapeutic exercise changes, additions or modifications this date.     Therapy Time:      PT Individual Minutes  Time In: 1300  Time Out: 8602  Minutes: 55  Timed Code Treatment Minutes: 45 Minutes  Procedure Minutes: 10 minutes cold pack    Modality Time In Time Out Total Minutes Units    Ther ex ([id number]) 1300 1345 45 3   Manual Therapy ([id number])       Neuro re-ed (13202)       Massage (44974)       Estim unattended   ([id number])           Electronically signed by Irwin Laguna, PT on 5/3/22 at 1:52 PM EDT

## 2022-05-05 ENCOUNTER — HOSPITAL ENCOUNTER (OUTPATIENT)
Dept: PHYSICAL THERAPY | Age: 67
Setting detail: THERAPIES SERIES
Discharge: HOME | End: 2022-05-05
Payer: COMMERCIAL

## 2022-05-05 PROCEDURE — 97110 THERAPEUTIC EXERCISES: CPT | Performed by: PHYSICAL THERAPIST

## 2022-05-05 ASSESSMENT — PAIN DESCRIPTION - ORIENTATION: ORIENTATION: LEFT

## 2022-05-05 ASSESSMENT — PAIN DESCRIPTION - LOCATION: LOCATION: KNEE

## 2022-05-05 NOTE — PROGRESS NOTES
[facility]  Outpatient Physical Therapy    Treatment Note        Date: 2022  Patient: Deanna Leija  : 1955   Confirmed: Yes  MRN: [id number]  Referring Provider: Radha Kay MD   Secondary Referring Provider (If applicable):     Medical Diagnosis: Sprain of unspecified site of left knee, subsequent encounter [S83.92XD] Sprain of the left knee  Treatment Diagnosis: Painful and weak left knee with antalgic gait pattern    Visit Information:  Insurance: Payor: [address] [place] [address] Nw / Plan: [address] [place] [address] COMP / Product Type: *No Product type* /   PT Visit Information  Onset Date: 22  PT Insurance Information: [facility] [employer] [place]  [id number]  Total # of Visits Approved: 12  Total # of Visits to Date:   Plan of Care/Certification Expiration Date: 22  Progress Note Counter: 10/12    Subjective Information:  Subjective: Patient states that his knee is doing good, goes back to full duty on Monday. HEP Compliance:  [x] Good [] Fair [] Poor [] Reports not doing due to:    Pain Screening  Patient Currently in Pain: Denies  Patient's Stated Pain Goal: 0 - No pain  Pain Location: Knee  Pain Orientation: Left    Treatment:  Exercises:  Exercises  Exercise 3: Apollo leg press 30# 2x10 reps  Exercise 5: Apollo leg extension 30#   and flexion 30# 20 reps  Exercise 7: standing 4-way hip green x10 b/l  Exercise 9: T-band Resistance walking 4-way x 5 ea. Exercise 10: Recombent bike level 5 for 10 minutes  Exercise 13: Step Ups 8 inch box Fwd , Lat moving right<>Left  x 10 ea. Exercise 14: Sit-stand from 18 inch box x 10 with 15#  Exercise 15:  Step stretch knee flexion / HS stretch  20\" x 3           Modalities:  Cryotherapy (CPT 20156)  Patient Position: Seated  Number Minutes Cryotherapy: 10  Cryotherapy location: Left,Knee       *Indicates exercise, modality, or manual techniques to be initiated when appropriate    Objective Measures: Strength: [] NT  [x] MMT completed:     Strength LLE  L Hip Flexion: 5/5  L Hip Extension: 5/5  L Hip ABduction: 5/5  L Knee Flexion: 5/5  L Knee Extension: 5/5                 ROM: [] NT  [x] ROM measurements:     AROM LLE (degrees)  L Knee Flexion 0-145: 125  L Knee Extension 0: 0             Assessment: Body Structures, Functions, Activity Limitations Requiring Skilled Therapeutic Intervention: Decreased functional mobility ,Decreased ROM,Decreased strength,Increased pain  Assessment: Patient is nearing completion of all goals. He will be returning to full duty status in the next week or so  Treatment Diagnosis: Painful and weak left knee with antalgic gait pattern  Therapy Prognosis: Good  Activity Tolerance  Activity Tolerance: Patient tolerated treatment well    Patient Education: PT Education: Home Exercise Program  Patient Education: Edu. pt on proper body mechanics with squating to avoid increased knee stain stress    Post-Pain Assessment:       Pain Rating (0-10 pain scale):   0/10   Location and pain description same as pre-treatment unless indicated. Action: [] NA   [x] Perform HEP  [] Meds as prescribed  [] Modalities as prescribed   [] Call Physician     GOALS   Patient Goal(s): Patient goals :  To feel better    Short Term Goals Completed by 3-5 treatments Goal Status   STG 1   Met   STG 2 No further soft tissue swelling of the left knee (goal met 3/23/22) Met   STG 3 Pain decreased to 2/10  (goal met 4/14/22) Met   STG 4       STG 5           Long Term Goals Completed by 6-12 treatment Goal Status   LTG 1 Active range of motion of the left knee within normal limits (goal met 3/23/22) Met   LTG 2 Strength of the left knee 5/5 Met   LTG 3 Pain 0/10 to 1/10 left knee (goal met 4/14/2022) Met   LTG 4 Ambulate with reciprical gait with no antalgia In progress   LTG 5 LEFI score will indicate no to minimal disabililty In progress   LTG 6 Long term goal 6: Patient will be able to return to full duty (returning to light duty on 4/18/22)             Plan:  Frequency/Duration:  Plan  Plan Frequency: 2 times weekly  Current Treatment Recommendations: [address] to work related activity,Home exercise program,Pain management,Modalities  Pt to continue current HEP. See objective section for any therapeutic exercise changes, additions or modifications this date.     Therapy Time:      PT Individual Minutes  Time In: 0804  Time Out: 9302  Minutes: 61  Timed Code Treatment Minutes: 51 Minutes  Procedure Minutes:10 minutes cold pack    Modality Time In Time Out Total Minutes Units    Ther ex ([id number]) 0804 3560 23 6   Manual Therapy (03721)       Neuro re-ed (64034)       Massage (45180)       Estim unattended   ([id number])           Electronically signed by Carol Hinkle PT on 5/5/22 at 9:11 AM EDT

## 2022-05-10 ENCOUNTER — HOSPITAL ENCOUNTER (OUTPATIENT)
Dept: PHYSICAL THERAPY | Age: 67
Setting detail: THERAPIES SERIES
Discharge: HOME | End: 2022-05-10
Payer: COMMERCIAL

## 2022-05-10 PROCEDURE — 97110 THERAPEUTIC EXERCISES: CPT

## 2022-05-10 NOTE — PROGRESS NOTES
[facility]  Outpatient Physical Therapy    Treatment Note        Date: 5/10/2022  Patient: Prem Parkinson  : 1955   Confirmed: Yes  MRN: [id number]  Referring Provider: Daily Yeung MD   Secondary Referring Provider (If applicable):     Medical Diagnosis: Sprain of unspecified site of left knee, subsequent encounter [B73.92XD]    Treatment Diagnosis: Painful and weak left knee with antalgic gait pattern    Visit Information:  Insurance: Payor: [address] [place] [address] Nw / Plan: [address] [place] [address] COMP / Product Type: *No Product type* /   PT Visit Information  Onset Date: 22  Total # of Visits Approved: 12  Total # of Visits to Date:   Plan of Care/Certification Expiration Date: 22  No Show: 0  Canceled Appointment: 0  Progress Note Counter:     Subjective Information:  Subjective: I went for a bike ride the other day and it didn't go well, it didn't take long for it to start hurting. HEP Compliance:  [x] Good [] Fair [] Poor [] Reports not doing due to:    Pain Screening  Patient Currently in Pain: Denies    Treatment:  Exercises:  Exercises  Exercise 3: Apollo leg press 30# 2x15 reps  Exercise 5: Apollo leg extension 30#   and flexion 30# 2 x 15  reps  Exercise 6: Carry 15# buckets (20# hand weight) 30' 6 times  Exercise 9: T-band Resistance walking 4-way x 30 feet  Exercise 10: Recombent bike level 7 for 10 minutes  Exercise 11: SLS AirEx foam  30s hold x 3 Daniel. Finger Tip touch needed to maintain balance. / Teseted L LE 14sec, 23 sec without UE support from floor Rith LE 10 sec. 23 sec. Exercise 12: Squats against the wall x10  Exercise 13: Step Ups 12 inch box Fwd , Lat moving right<>Left 8\"  x 10 ea.   Exercise 14: Sit-stand from 18 inch box x 10 with 15# /  Squats (Butt Taps) 18 inch box + 2 inch foam.  Exercise 17: Vertical ladder climb 3 steps up and down reciprical pattern 5 times  Exercise 18: Push/pull sled x 4 times 0#, 15#, 30#, 45# progressing weight with each attempt. Objective Measures:           Strength: [x] NT  [] MMT completed:        ROM: [x] NT  [] ROM measurements:        Balance: [] NT Static Balance 14 sec, 23 sec Left LE without UE support, 10 sec, 23 sec Right LE without UE support. Observations: [x] NT       Assessment: Body Structures, Functions, Activity Limitations Requiring Skilled Therapeutic Intervention: Decreased functional mobility ,Decreased ROM,Decreased strength,Increased pain  Assessment: Increased reps with current program working on strength and endurance, decreased cues needed as pt able to self correct most exercises for improved tolerance without therpaist intervention. Treatment Diagnosis: Painful and weak left knee with antalgic gait pattern  Therapy Prognosis: Good       Patient Education: [x] NA       Post-Pain Assessment:       Pain Rating (0-10 pain scale):  0 /10   Location and pain description same as pre-treatment unless indicated. Action: [] NA   [x] Perform HEP  [] Meds as prescribed  [] Modalities as prescribed   [] Call Physician     GOALS   Patient Goal(s): Patient goals :  To feel better    Short Term Goals Completed by 3-5 treatments Goal Status   STG 1   Met   STG 2 No further soft tissue swelling of the left knee (goal met 3/23/22) Met   STG 3 Pain decreased to 2/10  (goal met 4/14/22) Met       Long Term Goals Completed by 6-12 treatment Goal Status   LTG 1 Active range of motion of the left knee within normal limits (goal met 3/23/22) Met   LTG 2 Strength of the left knee 5/5 Met   LTG 3 Pain 0/10 to 1/10 left knee (goal met 4/14/2022) Met   LTG 4 Ambulate with reciprical gait with no antalgia In progress   LTG 5 LEFI score will indicate no to minimal disabililty In progress   LTG 6 Long term goal 6: Patient will be able to return to full duty  (returning to light duty on 4/18/22)             Plan:  Frequency/Duration:  Plan  Plan Frequency: 2 times weekly  Plan weeks: 6  Current Treatment Recommendations: Strengthening,ROM,Manual Therapy - Soft Tissue Mobilization,Manual Therapy - Joint Manipulation,Return to work related activity,Home exercise program,Pain management,Modalities  Pt to continue current HEP. See objective section for any therapeutic exercise changes, additions or modifications this date.     Therapy Time:      PT Individual Minutes  Time In: 2504  Time Out: 1514  Minutes: 55  Timed Code Treatment Minutes: 55 Minutes  Procedure Minutes:0    Modality Time In Time Out Total Minutes Units    Ther ex ([id number]) [id number]       Electronically signed by Talya De Oliveira PTA on 5/10/22 at 2:56 PM EDT

## 2022-05-12 ENCOUNTER — HOSPITAL ENCOUNTER (OUTPATIENT)
Dept: PHYSICAL THERAPY | Age: 67
Setting detail: THERAPIES SERIES
Discharge: HOME | End: 2022-05-12
Payer: COMMERCIAL

## 2022-05-12 PROCEDURE — 97110 THERAPEUTIC EXERCISES: CPT | Performed by: PHYSICAL THERAPIST

## 2022-05-12 ASSESSMENT — PAIN DESCRIPTION - FREQUENCY: FREQUENCY: CONTINUOUS

## 2022-05-12 ASSESSMENT — PAIN DESCRIPTION - PAIN TYPE: TYPE: CHRONIC PAIN

## 2022-05-12 ASSESSMENT — PAIN SCALES - GENERAL
PAINLEVEL_OUTOF10: 1
PAINLEVEL_OUTOF10: 1

## 2022-05-12 ASSESSMENT — PAIN DESCRIPTION - LOCATION: LOCATION: KNEE

## 2022-05-12 ASSESSMENT — PAIN DESCRIPTION - ORIENTATION: ORIENTATION: LEFT

## 2022-05-12 ASSESSMENT — PAIN DESCRIPTION - DESCRIPTORS: DESCRIPTORS: ACHING

## 2022-05-12 ASSESSMENT — PAIN - FUNCTIONAL ASSESSMENT: PAIN_FUNCTIONAL_ASSESSMENT: ACTIVITIES ARE NOT PREVENTED

## 2022-05-12 NOTE — PROGRESS NOTES
[facility]  Outpatient Physical Therapy    Treatment Note        Date: 2022  Patient: Michael Stapleton  : 1955   Confirmed: Yes  MRN: [id number]  Referring Provider: Kristi Ramirez MD   Secondary Referring Provider (If applicable):     Medical Diagnosis: Sprain of unspecified site of left knee, subsequent encounter [I21.92XD] Sprain of the left knee  Treatment Diagnosis: Painful and weak left knee with antalgic gait pattern    Visit Information:  Insurance: Payor: [address] [place], [place] [address]w / Plan: [address] [place] [address] COMP / Product Type: *No Product type* /   PT Visit Information  Onset Date: 22  PT Insurance Information: [facility] [employer] [place]  [id number]  Total # of Visits Approved: 12  Total # of Visits to Date:   Plan of Care/Certification Expiration Date: 22  No Show: 0  Canceled Appointment: 0  Progress Note Counter:     Subjective Information:  Subjective: Doing pretty well, a little sore, had a bad day at work yesterday  HEP Compliance:  [x] Good [] Fair [] Poor [] Reports not doing due to:    Pain Screening  Patient Currently in Pain: Yes  Pain Assessment: 0-10  Pain Level: 1  Best Pain Level: 1  Worst Pain Level: 4  Post Treatment Pain Level: 0  Patient's Stated Pain Goal: 0 - No pain  Pain Type: Chronic pain  Pain Location: Knee  Pain Orientation: Left  Pain Descriptors: Aching  Pain Frequency: Continuous  Functional Pain Assessment: Activities are not prevented  Aggravating factors: Walking,Kneeling    Treatment:  Exercises:  Exercises  Exercise 3: Apollo leg press 30# 2x15 reps  Exercise 5: Apollo leg extension 30#   and flexion 30# 2 x 15  reps  Exercise 7: standing 4-way hip green x10 b/l  Exercise 10: Regular  bike level 7 for 10 minutes  Exercise 11: SLS AirEx foam  30s hold x 3 Daniel. Finger Tip touch needed to maintain balance. / Teseted L LE 14sec, 23 sec without UE support from floor Rith LE 10 sec. 23 sec.   Exercise 13: Step Ups 12 inch box Fwd , Lat moving right<>Left 8\"  x 10 ea. Exercise 14: Sit-stand from 18 inch box x 10 with 15# /  Squats (Butt Taps) 18 inch box + 2 inch foam.  Exercise 15: Step stretch knee flexion / HS stretch  20\" x 3           Modalities:  Cryotherapy (CPT 77465)  Cryotherapy location: Left,Knee       *Indicates exercise, modality, or manual techniques to be initiated when appropriate    Objective Measures:      Strength: [] NT  [x] MMT completed:     Strength LLE  L Hip Flexion: 5/5  L Hip Extension: 5/5  L Knee Flexion: 5/5  L Knee Extension: 5/5              ROM: [] NT  [x] ROM measurements:     AROM LLE (degrees)  L Knee Flexion 0-145: 125  L Knee Extension 0: 0          Assessment:      Assessment: Patient has met his goals and will be transitioning back to full duty on Monday May 16th. Treatment Diagnosis: Painful and weak left knee with antalgic gait pattern     Activity Tolerance  Activity Tolerance: Patient tolerated treatment well    Patient Education: [] NA  PT Education: Home Exercise Program  Patient Education: Discussed with him about  continuing to work on his exercise program.  He reports that he is interested in getting a membership to a gym. Post-Pain Assessment:       Pain Rating (0-10 pain scale):  0 /10   Location and pain description same as pre-treatment unless indicated. Action: [] NA   [x] Perform HEP  [] Meds as prescribed  [] Modalities as prescribed   [] Call Physician     GOALS   Patient Goal(s): Patient goals :  To feel better    Short Term Goals Completed by 3-5 treatments Goal Status   STG 1 Increase flexion to 100 degrees, extension to 0 (goal met 3/23/22) Met   STG 2 No further soft tissue swelling of the left knee (goal met 3/23/22) Met   STG 3 Pain decreased to 2/10  (goal met 4/14/22) Met   STG 4       STG 5           Long Term Goals Completed by 6-12 treatment Goal Status   LTG 1       LTG 2 Strength of the left knee 5/5 Met   LTG 3 Pain 0/10 to 1/10 left knee (goal met 4/14/2022) Met   LTG 4 Ambulate with reciprical gait with no antalgia Met   LTG 5 LEFI score will indicate no to minimal disabililty Met   LTG 6 Long term goal 6: Patient will be able to return to full duty  (returning to light duty on 4/18/22)  (full duty 5/15/22)             Plan:  Frequency/Duration:  Plan  Plan Comment: Patient will be discharged from program due to goal attainment. Pt to continue current HEP. See objective section for any therapeutic exercise changes, additions or modifications this date.     Therapy Time:      PT Individual Minutes  Time In: 1300  Time Out: 9916  Minutes: 55  Timed Code Treatment Minutes: 45 Minutes  Procedure Minutes: 10 minutes ice    Modality Time In Time Out Total Minutes Units    Ther ex (84172) 1300 1345 45 3   Manual Therapy (32299)       Neuro re-ed (50697)       Massage (72703)       Estim unattended   (87871)           Electronically signed by Sandy Evans PT on 5/12/22 at 5:09 PM EDT

## 2022-05-12 NOTE — PROGRESS NOTES
[address]  PHYSICAL THERAPY PLAN OF CARE   [address], [address] [facility]         Ph: [phone]  Fax: [phone]    [] Certification  [] Recertification []  Plan of Care  [] Progress Note [x] Discharge      Referring Provider: Radha Kay MD      From:  Jordan Salazar PT   Patient: Deanna Leija (55 y.o. male) : 1955 Date: 2022   Medical Diagnosis: Sprain of unspecified site of left knee, subsequent encounter [S83.92XD] Sprain of the left knee  Treatment Diagnosis: Painful and weak left knee with antalgic gait pattern    Plan of Care/Certification Expiration Date: 22   Progress Report Period from:  2022  to 2022    Visits to Date:  No Show: 0 Cancelled Appts: 0    OBJECTIVE:   Short Term Goals - Time Frame for Short term goals: 3-5 treatments    Goals Current/Discharge status  Status   Short term goal 1:  Increase flexion to 100 degrees, extension to 0 (goal met 3/23/22)  Flexion 125, extension 0 Met   Short term goal 2: No further soft tissue swelling of the left knee (goal met 3/23/22)  No swelling noted Met   Short term goal 3: Pain decreased to 2/10  (goal met 22)  Pain 0/10 to 1/10 Met                    Long Term Goals - Time Frame for Long term goals : 6-12 treatment  Goals Current/ Discharge status Status          Long term goal 2: Strength of the left knee 5/5 Strength left knee 5/5 Met   Long term goal 3: Pain 0/10 to 1/10 left knee (goal met 2022) Pain reported 0/10 to 1/10 Met   Long term goal 4: Ambulate with reciprical gait with no antalgia Ambulates with non-antalgic pattern Met   Long term goal 5: LEFI score will indicate no to minimal disabililty LEFS score 55/80 Met   Long term goal 6: Patient will be able to return to full duty  (returning to light duty on 22)  (full duty 5/15/22) Returns to full duty on 5/15/2022                Assessment: Patient has met his goals and will be transitioning back to full duty on Monday May 16th. PT Education: Home Exercise Program  Patient Education: Discussed with him about  continuing to work on his exercise program.  He reports that he is interested in getting a membership to a gym. PLAN: [] Evaluate and Treat  Frequency/Duration:  Plan Comment: Patient will be discharged from program due to goal attainment. Patient Status:[] Continue/ Initiate plan of Care    [x] Discharge PT. Recommend pt continue with HEP. [] Additional visits requested, Please re-certify for additional visits:    [] Hold         Signature: Electronically signed by Jazmine Conrad PT on 5/12/22 at 5:11 PM EDT      If you have any questions or concerns, please don't hesitate to call. Thank you for your referral.    I have reviewed this plan of care and certify a need for medically necessary rehabilitation services.     Physician Signature:__________________________________________________________  Date:  Please sign and return

## 2023-03-27 ENCOUNTER — OFFICE VISIT (OUTPATIENT)
Dept: FAMILY MEDICINE CLINIC | Age: 68
End: 2023-03-27
Payer: COMMERCIAL

## 2023-03-27 VITALS
BODY MASS INDEX: 28.63 KG/M2 | HEART RATE: 81 BPM | DIASTOLIC BLOOD PRESSURE: 78 MMHG | TEMPERATURE: 97 F | WEIGHT: 204.5 LBS | OXYGEN SATURATION: 97 % | SYSTOLIC BLOOD PRESSURE: 138 MMHG | HEIGHT: 71 IN

## 2023-03-27 DIAGNOSIS — J40 BRONCHITIS: Primary | ICD-10-CM

## 2023-03-27 DIAGNOSIS — R05.1 ACUTE COUGH: ICD-10-CM

## 2023-03-27 LAB
INFLUENZA A ANTIBODY: NEGATIVE
INFLUENZA B ANTIBODY: NEGATIVE
Lab: NORMAL
PERFORMING INSTRUMENT: NORMAL
QC PASS/FAIL: NORMAL
SARS-COV-2, POC: NORMAL

## 2023-03-27 PROCEDURE — 87804 INFLUENZA ASSAY W/OPTIC: CPT | Performed by: NURSE PRACTITIONER

## 2023-03-27 PROCEDURE — 99213 OFFICE O/P EST LOW 20 MIN: CPT | Performed by: NURSE PRACTITIONER

## 2023-03-27 PROCEDURE — 1123F ACP DISCUSS/DSCN MKR DOCD: CPT | Performed by: NURSE PRACTITIONER

## 2023-03-27 PROCEDURE — 87426 SARSCOV CORONAVIRUS AG IA: CPT | Performed by: NURSE PRACTITIONER

## 2023-03-27 RX ORDER — BENZONATATE 100 MG/1
100 CAPSULE ORAL 3 TIMES DAILY PRN
Qty: 21 CAPSULE | Refills: 0 | Status: SHIPPED | OUTPATIENT
Start: 2023-03-27 | End: 2023-04-03

## 2023-03-27 RX ORDER — AZITHROMYCIN 250 MG/1
TABLET, FILM COATED ORAL
Qty: 1 PACKET | Refills: 0 | Status: SHIPPED | OUTPATIENT
Start: 2023-03-27

## 2023-03-27 SDOH — ECONOMIC STABILITY: FOOD INSECURITY: WITHIN THE PAST 12 MONTHS, YOU WORRIED THAT YOUR FOOD WOULD RUN OUT BEFORE YOU GOT MONEY TO BUY MORE.: NEVER TRUE

## 2023-03-27 SDOH — ECONOMIC STABILITY: FOOD INSECURITY: WITHIN THE PAST 12 MONTHS, THE FOOD YOU BOUGHT JUST DIDN'T LAST AND YOU DIDN'T HAVE MONEY TO GET MORE.: NEVER TRUE

## 2023-03-27 SDOH — ECONOMIC STABILITY: HOUSING INSECURITY
IN THE LAST 12 MONTHS, WAS THERE A TIME WHEN YOU DID NOT HAVE A STEADY PLACE TO SLEEP OR SLEPT IN A SHELTER (INCLUDING NOW)?: NO

## 2023-03-27 SDOH — ECONOMIC STABILITY: INCOME INSECURITY: HOW HARD IS IT FOR YOU TO PAY FOR THE VERY BASICS LIKE FOOD, HOUSING, MEDICAL CARE, AND HEATING?: NOT HARD AT ALL

## 2023-03-27 ASSESSMENT — ENCOUNTER SYMPTOMS
DIARRHEA: 0
SORE THROAT: 0
EYE ITCHING: 0
RHINORRHEA: 1
SHORTNESS OF BREATH: 0
WHEEZING: 0
APNEA: 0
ABDOMINAL DISTENTION: 0
SINUS PAIN: 0
CHEST TIGHTNESS: 0
COLOR CHANGE: 0
COUGH: 1
CONSTIPATION: 0
VOMITING: 0
NAUSEA: 0
EYE REDNESS: 0
TROUBLE SWALLOWING: 0

## 2023-03-27 ASSESSMENT — PATIENT HEALTH QUESTIONNAIRE - PHQ9
SUM OF ALL RESPONSES TO PHQ QUESTIONS 1-9: 0
2. FEELING DOWN, DEPRESSED OR HOPELESS: 0
SUM OF ALL RESPONSES TO PHQ QUESTIONS 1-9: 0
1. LITTLE INTEREST OR PLEASURE IN DOING THINGS: 0
SUM OF ALL RESPONSES TO PHQ9 QUESTIONS 1 & 2: 0

## 2023-03-27 NOTE — PROGRESS NOTES
if you start to feel better. Consider intake of yogurt or probiotic during antibiotic use and for a few days after to help reduce the risk of developing a secondary infection. Separate the yogurt and antibiotic by at least 1 hour. Avoid alcohol while taking antibiotics. No orders of the defined types were placed in this encounter. Pt left the [address] [place] [address] today in stable condition. Discussed signs and symptoms which require immediate follow-up in ED/call to 911. Patient verbalized understanding. No follow-ups on file. Reviewed with the patient: current clinical status, medications, activities and diet. Side effects, adverse effects of the medication prescribed today, as well as treatment plan and result expectations have been discussed with the patient who expresses understanding and desires to proceed. Close follow up to evaluate treatment results and for coordination of care. I have reviewed the patient's medical history in detail and updated the computerized patient record.       Elaine Lemus, APRN - CNP

## 2023-04-04 DIAGNOSIS — I10 HYPERTENSION, UNSPECIFIED TYPE: ICD-10-CM

## 2023-04-04 RX ORDER — ATORVASTATIN CALCIUM 10 MG/1
1 TABLET, FILM COATED ORAL NIGHTLY
COMMUNITY
Start: 2022-03-29 | End: 2023-05-30 | Stop reason: SDUPTHER

## 2023-04-04 RX ORDER — LOSARTAN POTASSIUM 100 MG/1
100 TABLET ORAL DAILY
Qty: 30 TABLET | Refills: 0 | Status: SHIPPED | OUTPATIENT
Start: 2023-04-04 | End: 2023-05-30 | Stop reason: SDUPTHER

## 2023-04-04 RX ORDER — MELOXICAM 15 MG/1
1 TABLET ORAL DAILY PRN
COMMUNITY
Start: 2019-11-11 | End: 2023-05-30 | Stop reason: ALTCHOICE

## 2023-04-04 RX ORDER — LOSARTAN POTASSIUM 100 MG/1
1 TABLET ORAL DAILY
COMMUNITY
Start: 2022-02-24 | End: 2023-04-04 | Stop reason: SDUPTHER

## 2023-04-04 RX ORDER — CARVEDILOL 12.5 MG/1
1 TABLET ORAL 2 TIMES DAILY
COMMUNITY
Start: 2020-11-03 | End: 2023-04-04 | Stop reason: SDUPTHER

## 2023-04-04 RX ORDER — CARVEDILOL 12.5 MG/1
12.5 TABLET ORAL 2 TIMES DAILY
Qty: 60 TABLET | Refills: 0 | Status: SHIPPED | OUTPATIENT
Start: 2023-04-04 | End: 2023-05-16 | Stop reason: SDUPTHER

## 2023-05-16 DIAGNOSIS — I10 HYPERTENSION, UNSPECIFIED TYPE: ICD-10-CM

## 2023-05-16 RX ORDER — CARVEDILOL 12.5 MG/1
12.5 TABLET ORAL 2 TIMES DAILY
Qty: 180 TABLET | Refills: 3 | Status: SHIPPED | OUTPATIENT
Start: 2023-05-16 | End: 2024-05-15

## 2023-05-16 NOTE — TELEPHONE ENCOUNTER
Carvedilol refill to [facility]. Pt states he is completely out, accidentally spilled the rest of what he had in the sink this morning

## 2023-05-30 ENCOUNTER — OFFICE VISIT (OUTPATIENT)
Dept: PRIMARY CARE | Facility: CLINIC | Age: 68
End: 2023-05-30
Payer: COMMERCIAL

## 2023-05-30 VITALS
OXYGEN SATURATION: 96 % | DIASTOLIC BLOOD PRESSURE: 90 MMHG | RESPIRATION RATE: 14 BRPM | BODY MASS INDEX: 28.42 KG/M2 | WEIGHT: 203 LBS | TEMPERATURE: 97.3 F | HEART RATE: 77 BPM | SYSTOLIC BLOOD PRESSURE: 150 MMHG | HEIGHT: 71 IN

## 2023-05-30 DIAGNOSIS — M79.642 BILATERAL HAND PAIN: ICD-10-CM

## 2023-05-30 DIAGNOSIS — Z00.00 WELLNESS EXAMINATION: Primary | ICD-10-CM

## 2023-05-30 DIAGNOSIS — I10 HYPERTENSION, UNSPECIFIED TYPE: ICD-10-CM

## 2023-05-30 DIAGNOSIS — E78.2 MIXED HYPERLIPIDEMIA: ICD-10-CM

## 2023-05-30 DIAGNOSIS — M79.641 BILATERAL HAND PAIN: ICD-10-CM

## 2023-05-30 PROBLEM — M19.90 ARTHRITIS: Status: ACTIVE | Noted: 2023-05-30

## 2023-05-30 PROBLEM — E78.5 HLD (HYPERLIPIDEMIA): Status: ACTIVE | Noted: 2023-05-30

## 2023-05-30 PROBLEM — N40.0 BPH (BENIGN PROSTATIC HYPERPLASIA): Status: ACTIVE | Noted: 2023-05-30

## 2023-05-30 PROCEDURE — 1036F TOBACCO NON-USER: CPT | Performed by: INTERNAL MEDICINE

## 2023-05-30 PROCEDURE — 99397 PER PM REEVAL EST PAT 65+ YR: CPT | Performed by: INTERNAL MEDICINE

## 2023-05-30 PROCEDURE — 3080F DIAST BP >= 90 MM HG: CPT | Performed by: INTERNAL MEDICINE

## 2023-05-30 PROCEDURE — 1159F MED LIST DOCD IN RCRD: CPT | Performed by: INTERNAL MEDICINE

## 2023-05-30 PROCEDURE — 1160F RVW MEDS BY RX/DR IN RCRD: CPT | Performed by: INTERNAL MEDICINE

## 2023-05-30 PROCEDURE — 3077F SYST BP >= 140 MM HG: CPT | Performed by: INTERNAL MEDICINE

## 2023-05-30 RX ORDER — LOSARTAN POTASSIUM AND HYDROCHLOROTHIAZIDE 12.5; 1 MG/1; MG/1
1 TABLET ORAL DAILY
Qty: 30 TABLET | Refills: 11 | Status: SHIPPED | OUTPATIENT
Start: 2023-05-30 | End: 2024-05-29

## 2023-05-30 RX ORDER — ATORVASTATIN CALCIUM 10 MG/1
10 TABLET, FILM COATED ORAL NIGHTLY
Qty: 90 TABLET | Refills: 3 | Status: SHIPPED | OUTPATIENT
Start: 2023-05-30

## 2023-05-30 RX ORDER — LOSARTAN POTASSIUM 100 MG/1
100 TABLET ORAL DAILY
Qty: 30 TABLET | Refills: 0 | Status: SHIPPED | OUTPATIENT
Start: 2023-05-30 | End: 2023-05-30

## 2023-05-30 RX ORDER — ASPIRIN 81 MG/1
81 TABLET ORAL DAILY
COMMUNITY

## 2023-05-30 RX ORDER — LOSARTAN POTASSIUM AND HYDROCHLOROTHIAZIDE 12.5; 1 MG/1; MG/1
1 TABLET ORAL DAILY
Qty: 30 TABLET | Refills: 11 | Status: SHIPPED | OUTPATIENT
Start: 2023-05-30 | End: 2023-05-30 | Stop reason: ENTERED-IN-ERROR

## 2023-05-30 ASSESSMENT — PATIENT HEALTH QUESTIONNAIRE - PHQ9
SUM OF ALL RESPONSES TO PHQ9 QUESTIONS 1 AND 2: 0
2. FEELING DOWN, DEPRESSED OR HOPELESS: NOT AT ALL
1. LITTLE INTEREST OR PLEASURE IN DOING THINGS: NOT AT ALL

## 2023-05-30 ASSESSMENT — ENCOUNTER SYMPTOMS
DEPRESSION: 0
OCCASIONAL FEELINGS OF UNSTEADINESS: 1
LOSS OF SENSATION IN FEET: 1

## 2023-05-30 NOTE — PROGRESS NOTES
"Subjective    Rocky Doe is a 68 y.o. male who presents for Annual Exam.  HPI    Declines shingrix, flu vaccines  Colonoscopy 2019, due 2029    Blood pressure has been elevated. Ran out of losartan, but it was elevated while on it as well. He has been out for a week or two. His blood pressure has been 179/100 was having headaches  His blood pressure has been higher lately.  Changed diet. Decreased salt. Smaller portions.   No chest pain  He is no longer taking atorvastatin. It just ran out and he did not get it filled      Review of Systems   All other systems reviewed and are negative.        Objective     /90 (BP Location: Left arm, Patient Position: Sitting, BP Cuff Size: Adult)   Pulse 77   Temp 36.3 °C (97.3 °F) (Skin)   Resp 14   Ht 1.803 m (5' 11\")   Wt 92.1 kg (203 lb)   SpO2 96%   BMI 28.31 kg/m²    Physical Exam  Vitals and nursing note reviewed.   Constitutional:       Appearance: Normal appearance.   Neck:      Thyroid: No thyroid mass or thyromegaly.   Cardiovascular:      Rate and Rhythm: Normal rate and regular rhythm.      Pulses: Normal pulses.      Heart sounds: Normal heart sounds.   Pulmonary:      Effort: Pulmonary effort is normal.      Breath sounds: Normal breath sounds.   Abdominal:      General: Abdomen is flat. Bowel sounds are normal.      Palpations: Abdomen is soft.   Musculoskeletal:      Cervical back: Normal range of motion and neck supple.   Neurological:      General: No focal deficit present.      Mental Status: He is alert.   Psychiatric:         Mood and Affect: Mood normal.       Health Maintenance Due   Topic Date Due    Yearly Adult Physical  Never done    COVID-19 Vaccine (1) Never done    Hepatitis C Screening  Never done    Diabetes Screening  Never done    Zoster Vaccines (1 of 2) Never done    Hepatitis B Vaccines (2 of 3 - 19+ 3-dose series) 06/10/2013    Abdominal Aortic Aneurysm (AAA) Screening  Never done    Pneumococcal Vaccine: 65+ Years (1 - " PCV) Never done    DTaP/Tdap/Td Vaccines (2 - Td or Tdap) 12/10/2022          Assessment/Plan   Problem List Items Addressed This Visit          Circulatory    HTN (hypertension)    Relevant Orders    Comprehensive Metabolic Panel    Lipid Panel    Follow Up In [facility] - PCP       Other    HLD (hyperlipidemia)    Relevant Medications    atorvastatin (Lipitor) 10 mg tablet    losartan-hydrochlorothiazide (Hyzaar) 100-12.5 mg tablet    Other Relevant Orders    Comprehensive Metabolic Panel    Lipid Panel     Other Visit Diagnoses       Wellness examination    -  Primary    Relevant Orders    CBC    Comprehensive Metabolic Panel    Lipid Panel    Prostate Specific Antigen    Bilateral hand pain        Relevant Orders    XR hand left 1-2 views    XR hand right 1-2 views    Iron and TIBC        Change losartan 100 to losartan hydrochlorothiazide 100/12.5, cont other meds. Refil atorvastatin.  Recommend he call if he has headache with elevated bp again   Check labs a week after starting new med. Side effects discussed.  Follow up in 2 months for bp followup  Hands hurt. He has had rheum work up.  Will check xrays

## 2023-05-30 NOTE — PATIENT INSTRUCTIONS
Call  with any problems or questions.   Follow up in 2 months for repeat bp   Side effects  discussed.  Check labs 1-2 weeks after starting new bp med.

## 2023-06-14 ENCOUNTER — LAB (OUTPATIENT)
Dept: LAB | Facility: LAB | Age: 68
End: 2023-06-14
Payer: COMMERCIAL

## 2023-06-14 DIAGNOSIS — Z00.00 WELLNESS EXAMINATION: ICD-10-CM

## 2023-06-14 DIAGNOSIS — M79.641 BILATERAL HAND PAIN: ICD-10-CM

## 2023-06-14 DIAGNOSIS — I10 HYPERTENSION, UNSPECIFIED TYPE: ICD-10-CM

## 2023-06-14 DIAGNOSIS — E78.2 MIXED HYPERLIPIDEMIA: ICD-10-CM

## 2023-06-14 DIAGNOSIS — M79.642 BILATERAL HAND PAIN: ICD-10-CM

## 2023-06-14 LAB
ALANINE AMINOTRANSFERASE (SGPT) (U/L) IN SER/PLAS: 26 U/L (ref 10–52)
ALBUMIN (G/DL) IN SER/PLAS: 4.4 G/DL (ref 3.4–5)
ALKALINE PHOSPHATASE (U/L) IN SER/PLAS: 47 U/L (ref 33–136)
ANION GAP IN SER/PLAS: 8 MMOL/L (ref 10–20)
ASPARTATE AMINOTRANSFERASE (SGOT) (U/L) IN SER/PLAS: 21 U/L (ref 9–39)
BILIRUBIN TOTAL (MG/DL) IN SER/PLAS: 0.8 MG/DL (ref 0–1.2)
CALCIUM (MG/DL) IN SER/PLAS: 9.5 MG/DL (ref 8.6–10.3)
CARBON DIOXIDE, TOTAL (MMOL/L) IN SER/PLAS: 32 MMOL/L (ref 21–32)
CHLORIDE (MMOL/L) IN SER/PLAS: 103 MMOL/L (ref 98–107)
CHOLESTEROL (MG/DL) IN SER/PLAS: 155 MG/DL (ref 0–199)
CHOLESTEROL IN HDL (MG/DL) IN SER/PLAS: 32.6 MG/DL
CHOLESTEROL/HDL RATIO: 4.8
CREATININE (MG/DL) IN SER/PLAS: 1.02 MG/DL (ref 0.5–1.3)
ERYTHROCYTE DISTRIBUTION WIDTH (RATIO) BY AUTOMATED COUNT: 12.6 % (ref 11.5–14.5)
ERYTHROCYTE MEAN CORPUSCULAR HEMOGLOBIN CONCENTRATION (G/DL) BY AUTOMATED: 33.7 G/DL (ref 32–36)
ERYTHROCYTE MEAN CORPUSCULAR VOLUME (FL) BY AUTOMATED COUNT: 91 FL (ref 80–100)
ERYTHROCYTES (10*6/UL) IN BLOOD BY AUTOMATED COUNT: 4.71 X10E12/L (ref 4.5–5.9)
GFR MALE: 80 ML/MIN/1.73M2
GLUCOSE (MG/DL) IN SER/PLAS: 116 MG/DL (ref 74–99)
HEMATOCRIT (%) IN BLOOD BY AUTOMATED COUNT: 42.7 % (ref 41–52)
HEMOGLOBIN (G/DL) IN BLOOD: 14.4 G/DL (ref 13.5–17.5)
IRON (UG/DL) IN SER/PLAS: 118 UG/DL (ref 35–150)
IRON BINDING CAPACITY (UG/DL) IN SER/PLAS: 323 UG/DL (ref 240–445)
IRON SATURATION (%) IN SER/PLAS: 37 % (ref 25–45)
LDL: 79 MG/DL (ref 0–99)
LEUKOCYTES (10*3/UL) IN BLOOD BY AUTOMATED COUNT: 7 X10E9/L (ref 4.4–11.3)
NON HDL CHOLESTEROL: 122 MG/DL
PLATELETS (10*3/UL) IN BLOOD AUTOMATED COUNT: 186 X10E9/L (ref 150–450)
POTASSIUM (MMOL/L) IN SER/PLAS: 4.2 MMOL/L (ref 3.5–5.3)
PROTEIN TOTAL: 6.9 G/DL (ref 6.4–8.2)
SODIUM (MMOL/L) IN SER/PLAS: 139 MMOL/L (ref 136–145)
TRIGLYCERIDE (MG/DL) IN SER/PLAS: 219 MG/DL (ref 0–149)
UREA NITROGEN (MG/DL) IN SER/PLAS: 17 MG/DL (ref 6–23)
VLDL: 44 MG/DL (ref 0–40)

## 2023-06-14 PROCEDURE — 83540 ASSAY OF IRON: CPT

## 2023-06-14 PROCEDURE — 84153 ASSAY OF PSA TOTAL: CPT

## 2023-06-14 PROCEDURE — 83550 IRON BINDING TEST: CPT

## 2023-06-14 PROCEDURE — 36415 COLL VENOUS BLD VENIPUNCTURE: CPT

## 2023-06-14 PROCEDURE — 80053 COMPREHEN METABOLIC PANEL: CPT

## 2023-06-14 PROCEDURE — 80061 LIPID PANEL: CPT

## 2023-06-14 PROCEDURE — 85027 COMPLETE CBC AUTOMATED: CPT

## 2023-06-15 LAB — PROSTATE SPECIFIC AG (NG/ML) IN SER/PLAS: 2.88 NG/ML (ref 0–4)

## 2023-06-15 NOTE — RESULT ENCOUNTER NOTE
His labs are good but his gluocose is a little elevated. Watch diet and will recheck at his follow up in july

## 2023-06-16 ENCOUNTER — TELEPHONE (OUTPATIENT)
Dept: PRIMARY CARE | Facility: CLINIC | Age: 68
End: 2023-06-16
Payer: COMMERCIAL

## 2023-06-16 NOTE — TELEPHONE ENCOUNTER
----- Message from Shira Gordon CMA sent at 6/16/2023  9:00 AM EDT -----    ----- Message -----  From: Alena Craft DO  Sent: 6/15/2023   8:27 AM EDT  To: Shira Gordon CMA    His labs are good but his gluocose is a little elevated. Watch diet and will recheck at his follow up in july

## 2023-06-16 NOTE — TELEPHONE ENCOUNTER
"----- Message from Alena Craft DO sent at 6/16/2023  1:11 PM EDT -----  His hand xrays show osteoarthritis. \"Wear and tear\" type arthritis  "

## 2023-06-19 NOTE — TELEPHONE ENCOUNTER
DO Javier Chu, [facility]  Caller: Unspecified (3 days ago,  2:22 PM)  Tylenol or ibuprofen

## 2023-07-26 ENCOUNTER — OFFICE VISIT (OUTPATIENT)
Dept: PRIMARY CARE | Facility: CLINIC | Age: 68
End: 2023-07-26
Payer: COMMERCIAL

## 2023-07-26 VITALS
OXYGEN SATURATION: 96 % | DIASTOLIC BLOOD PRESSURE: 60 MMHG | BODY MASS INDEX: 28.7 KG/M2 | HEART RATE: 70 BPM | HEIGHT: 71 IN | RESPIRATION RATE: 14 BRPM | WEIGHT: 205 LBS | SYSTOLIC BLOOD PRESSURE: 130 MMHG | TEMPERATURE: 97.8 F

## 2023-07-26 DIAGNOSIS — I10 HYPERTENSION, UNSPECIFIED TYPE: Primary | ICD-10-CM

## 2023-07-26 DIAGNOSIS — E78.2 MIXED HYPERLIPIDEMIA: ICD-10-CM

## 2023-07-26 DIAGNOSIS — M19.90 ARTHRITIS: ICD-10-CM

## 2023-07-26 PROCEDURE — 1159F MED LIST DOCD IN RCRD: CPT | Performed by: INTERNAL MEDICINE

## 2023-07-26 PROCEDURE — 1160F RVW MEDS BY RX/DR IN RCRD: CPT | Performed by: INTERNAL MEDICINE

## 2023-07-26 PROCEDURE — 3078F DIAST BP <80 MM HG: CPT | Performed by: INTERNAL MEDICINE

## 2023-07-26 PROCEDURE — 1036F TOBACCO NON-USER: CPT | Performed by: INTERNAL MEDICINE

## 2023-07-26 PROCEDURE — 99213 OFFICE O/P EST LOW 20 MIN: CPT | Performed by: INTERNAL MEDICINE

## 2023-07-26 PROCEDURE — 3075F SYST BP GE 130 - 139MM HG: CPT | Performed by: INTERNAL MEDICINE

## 2023-07-26 ASSESSMENT — ENCOUNTER SYMPTOMS
PALPITATIONS: 0
WHEEZING: 0
DIARRHEA: 0
SHORTNESS OF BREATH: 0
HYPERTENSION: 1
COUGH: 0
CONSTIPATION: 0

## 2023-07-26 NOTE — PROGRESS NOTES
"Subjective   Patient ID: Rocky Doe is a 68 y.o. male who presents for Hypertension.    He is feeling well. No  problems with the new med. His blood pressure is good.   No lightheadedness or muscle cramps.       Hypertension  Pertinent negatives include no chest pain, palpitations or shortness of breath.        Review of Systems   Respiratory:  Negative for cough, shortness of breath and wheezing.    Cardiovascular:  Negative for chest pain and palpitations.   Gastrointestinal:  Negative for constipation and diarrhea.   All other systems reviewed and are negative.      Objective   /60 (BP Location: Left arm, Patient Position: Sitting, BP Cuff Size: Adult)   Pulse 70   Temp 36.6 °C (97.8 °F) (Tympanic)   Resp 14   Ht 1.803 m (5' 11\")   Wt 93 kg (205 lb)   SpO2 96%   BMI 28.59 kg/m²     Physical Exam  Vitals reviewed.   Constitutional:       General: He is not in acute distress.     Appearance: Normal appearance.   Cardiovascular:      Rate and Rhythm: Normal rate and regular rhythm.      Pulses: Normal pulses.      Heart sounds: Normal heart sounds.   Pulmonary:      Effort: Pulmonary effort is normal.      Breath sounds: Normal breath sounds.   Abdominal:      Tenderness: There is no abdominal tenderness.   Musculoskeletal:         General: No swelling.   Skin:     General: Skin is warm and dry.   Neurological:      Mental Status: He is alert.         Assessment/Plan   Problem List Items Addressed This Visit       Arthritis    HLD (hyperlipidemia)    HTN (hypertension) - Primary   His blood pressure is good. His labs are good. His glucose was a little elevated but he was not fasting. He has cut out sweets in his diet and overall is doing well.         "

## 2024-01-30 ENCOUNTER — APPOINTMENT (OUTPATIENT)
Dept: PRIMARY CARE | Facility: CLINIC | Age: 69
End: 2024-01-30
Payer: COMMERCIAL